# Patient Record
Sex: MALE | Race: WHITE | NOT HISPANIC OR LATINO | Employment: OTHER | URBAN - METROPOLITAN AREA
[De-identification: names, ages, dates, MRNs, and addresses within clinical notes are randomized per-mention and may not be internally consistent; named-entity substitution may affect disease eponyms.]

---

## 2018-07-11 ENCOUNTER — TRANSCRIBE ORDERS (OUTPATIENT)
Dept: PHYSICAL THERAPY | Facility: REHABILITATION | Age: 73
End: 2018-07-11

## 2018-07-11 ENCOUNTER — EVALUATION (OUTPATIENT)
Dept: PHYSICAL THERAPY | Facility: REHABILITATION | Age: 73
End: 2018-07-11
Payer: OTHER GOVERNMENT

## 2018-07-11 DIAGNOSIS — M25.561 RIGHT KNEE PAIN, UNSPECIFIED CHRONICITY: Primary | ICD-10-CM

## 2018-07-11 PROCEDURE — G8978 MOBILITY CURRENT STATUS: HCPCS | Performed by: PHYSICAL THERAPIST

## 2018-07-11 PROCEDURE — 97161 PT EVAL LOW COMPLEX 20 MIN: CPT | Performed by: PHYSICAL THERAPIST

## 2018-07-11 PROCEDURE — G8979 MOBILITY GOAL STATUS: HCPCS | Performed by: PHYSICAL THERAPIST

## 2018-07-11 RX ORDER — ASPIRIN 81 MG/1
81 TABLET ORAL DAILY
COMMUNITY

## 2018-07-11 RX ORDER — BUDESONIDE AND FORMOTEROL FUMARATE DIHYDRATE 160; 4.5 UG/1; UG/1
2 AEROSOL RESPIRATORY (INHALATION) 2 TIMES DAILY
COMMUNITY

## 2018-07-11 RX ORDER — SERTRALINE HYDROCHLORIDE 100 MG/1
50 TABLET, FILM COATED ORAL DAILY
COMMUNITY

## 2018-07-11 RX ORDER — GABAPENTIN 100 MG/1
100 CAPSULE ORAL 3 TIMES DAILY
COMMUNITY

## 2018-07-11 RX ORDER — LOSARTAN POTASSIUM 100 MG/1
100 TABLET ORAL DAILY
COMMUNITY

## 2018-07-11 RX ORDER — LORATADINE 10 MG/1
10 TABLET ORAL DAILY
COMMUNITY

## 2018-07-11 RX ORDER — TAMSULOSIN HYDROCHLORIDE 0.4 MG/1
0.4 CAPSULE ORAL
COMMUNITY

## 2018-07-11 RX ORDER — EZETIMIBE 10 MG/1
10 TABLET ORAL DAILY
COMMUNITY

## 2018-07-11 RX ORDER — DEXTROSE 40 G/100ML
INJECTION, SOLUTION INTRAVENOUS
COMMUNITY

## 2018-07-11 RX ORDER — CARBOXYMETHYLCELLULOSE SODIUM 5 MG/ML
1 SOLUTION/ DROPS OPHTHALMIC 3 TIMES DAILY PRN
COMMUNITY

## 2018-07-11 RX ORDER — INSULIN GLARGINE 100 [IU]/ML
INJECTION, SOLUTION SUBCUTANEOUS
COMMUNITY

## 2018-07-11 RX ORDER — ATORVASTATIN CALCIUM 80 MG/1
80 TABLET, FILM COATED ORAL DAILY
COMMUNITY

## 2018-07-11 NOTE — PROGRESS NOTES
PT Evaluation     Today's date: 2018  Patient name: Tonia Samuels  : [birth year]  MRN: [id number]  Referring provider: Maty Ayala MD  Dx:   Encounter Diagnosis     ICD-10-CM    1  Right knee pain, unspecified chronicity M25 561        Start Time: 945  Stop Time: 1040  Total time in clinic (min): 55 minutes    Assessment  Impairments: abnormal coordination, abnormal gait, abnormal muscle firing, abnormal or restricted ROM, abnormal movement, activity intolerance, impaired balance, impaired physical strength, lacks appropriate home exercise program, pain with function, safety issue and weight-bearing intolerance    Assessment details: Tonia Samuels is a 68 y o  male who presents with pain, decreased strength, ambulatory dysfunction and decreased balance  Due to these impairments, Patient has difficulty performing a/iadls, recreational activities and engaging in social activities  Patient's clinical presentation is consistent with their referring diagnosis of right knee pain with 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  Patient would benefit from skilled physical therapy to address their aforementioned impairments, improve their level of function and to improve their overall quality of life  Understanding of Dx/Px/POC: excellent  Goals  Short Term Goals: to be achieved in 4 weeks  1) Patient to be independent with basic HEP  2) Decrease pain at it's worst to 5/10 on VAS  3) Increase LE strength by 1/2 MMT grade in all deficient planes  4) Patient to report decreased sleep interruption secondary to pain  5) Patient to negotiate steps with a step-to pattern with use of HR  6) Increase ambulatory tolerance to 30 minutes  Long Term Goals: to be achieved by discharge  1) FOTO equal to or greater than 59   2) Patient to be independent with comprehensive HEP  3) Abolish pain for improved quality of life  4) Increase LE strength to 5/5 MMT grade in all planes to improve A/IADLS    5) Patient to negotiate steps with a reciprocal pattern without use of Hr  6) Increase ambulatory tolerance to 45 minutes  7) Patient to report no sleep interruption secondary to pain  Plan  Patient would benefit from: skilled PT  Planned modality interventions: biofeedback, cryotherapy, hydrotherapy, TENS and unattended electrical stimulation  Planned therapy interventions: activity modification, ADL retraining, balance, balance/weight bearing training, behavior modification, body mechanics training, functional ROM exercises, gait training, home exercise program, IADL retraining, joint mobilization, manual therapy, massage, neuromuscular re-education, patient education, strengthening, stretching, therapeutic activities, therapeutic exercise and transfer training  Frequency: 2-3x week  Duration in weeks: 12  Treatment plan discussed with: patient        Subjective Evaluation    History of Present Illness  Onset date: February 2018  Mechanism of injury: Patient reports that in February his right knee acutely gave out  Patient doesn't recall any specific motion or activity that caused his knee to give out  Following his injury, Patient developed increased swelling in his right knee and calf, stating that both calves felt like they weighed 50 lbs  Patient underwent an US, which was negative for DVT, but did identify the presence of a Baker's cyst  Patient reports that he has experienced increased pain ever since this episode and due to having a bad left knee, has been compensating since  Patient's right knee intermittently feels unstable and he is fearful of it buckling  Patient has been avoiding activities, including walking and the gym, to prevent further injury  Patient has intermittent clicking in his knee, but denies experiencing tingling/numbness  Patient has declined to undergo viscosupplementation and steroid injections to date   Patient anticipates requesting an MRI at his next f/u appointment with his MD   Pain  Current pain ratin  At best pain rating: 3  At worst pain rating: 10  Location: anterior right knee (pointing to patellar tendon), VMO, popliteal space  Quality: "like somebody cut you, sore"  Alleviating factors: standing on toes, Aleve  Exacerbated by: twisting, prolonged ambulation (a few blocks), squatting, getting up from chair, negotiating steps  Progression: worsening    Social Support    Employment status: not working    Diagnostic Tests  CT scan: abnormal (+) arthritic changes, per Pt  report  Treatments  Previous treatment: medication  Patient Goals  Patient goals for therapy: decreased edema, decreased pain, return to sport/leisure activities, independence with ADLs/IADLs and increased strength          Objective     Tenderness     Right Knee   Tenderness in the inferior patella, lateral patella and medial patella  Neurological Testing     Sensation     Knee   Left Knee   Intact: light touch    Right Knee   Intact: light touch     Active Range of Motion   Left Knee   Normal active range of motion    Right Knee   Normal active range of motion    Passive Range of Motion   Left Knee   Normal passive range of motion    Right Knee   Normal passive range of motion    Mobility   Patellar Mobility:   Left Knee   WFL: medial, lateral, superior and inferior  Right Knee   WFL: medial, lateral, superior and inferior    Patellar Mobility Comments   Right inferior tendon comments: (+) pain  Strength/Myotome Testing     Left Hip   Planes of Motion   Flexion: 4+  Extension: 5  Abduction: 4    Right Hip   Planes of Motion   Flexion: 4+  Extension: 5  Abduction: 4    Left Knee   Flexion: 5  Extension: 5    Right Knee   Flexion: 4 (+) anterior knee pain  Extension: 5    Left Ankle/Foot   Dorsiflexion: 5    Right Ankle/Foot   Dorsiflexion: 4+    Tests     Right Knee   Positive patella-femoral grind     Negative anterior Lachman, lateral Fernanda, medial Fernanda, valgus stress test at 0 degrees, valgus stress test at 30 degrees, varus stress test at 0 degrees and varus stress test at 30 degrees  Ambulation   Weight-Bearing Status   Weight-Bearing Status (Left): full weight bearing   Weight-Bearing Status (Right): full weight-bearing    Assistive device used: single point cane    Ambulation: Level Surfaces   Ambulation with assistive device: independent    Observational Gait   Gait: antalgic     Functional Assessment   Squat   Left within functional limits and right within functional limits  Single Leg Stance - Eyes Open   Left  Trial 1: 2 seconds    Right  Trial 1: 3 seconds    Comments  (+) right knee crepitus with bilateral squat      Flowsheet Rows      Most Recent Value   PT/OT G-Codes   Current Score  37   Projected Score  61   FOTO information reviewed  Yes   Assessment Type  Evaluation   G code set  Mobility: Walking & Moving Around   Mobility: Walking and Moving Around Current Status ()  CK   Mobility: Walking and Moving Around Goal Status ()  CJ          Precautions: HTN, CAD, DM    Daily Treatment Diary     Manual  7/11            Right gastroc, h/s, hip flexor str  Exercise Diary  7/11            Bike             VG             Slantboard gastroc str  Hamstring, hip flexor str               SLR: supine, s/l             LSD             PB wall slides                                                                                                                                                                                          Modalities  7/11

## 2018-07-11 NOTE — LETTER
2018    Olya Pastrana, [address]    Patient: Kassi Patel   YOB: 1945   Date of Visit: 2018     Encounter Diagnosis     ICD-10-CM    1  Right knee pain, unspecified chronicity M25 561        Dear Dr Jose Carlos Guerrero:    Please review the attached Plan of Care from [facility] recent visit  Please verify that you agree therapy should continue by signing the attached document and sending it back to our office  If you have any questions or concerns, please don't hesitate to call  Sincerely,    Rossy Brush, PT      Referring Provider:      I certify that I have read the below Plan of Care and certify the need for these services furnished under this plan of treatment while under my care  Olya Pastrana MD  [address]  [address]: [phone]          PT Evaluation     Today's date: 2018  Patient name: Kassi Patel  :   MRN: [id number]  Referring provider: Coby Matthews MD  Dx:   Encounter Diagnosis     ICD-10-CM    1  Right knee pain, unspecified chronicity M25 561        Start Time: 09  Stop Time: 1040  Total time in clinic (min): 55 minutes    Assessment  Impairments: abnormal coordination, abnormal gait, abnormal muscle firing, abnormal or restricted ROM, abnormal movement, activity intolerance, impaired balance, impaired physical strength, lacks appropriate home exercise program, pain with function, safety issue and weight-bearing intolerance    Assessment details: Kassi Patel is a 68 y o  male who presents with pain, decreased strength, ambulatory dysfunction and decreased balance  Due to these impairments, Patient has difficulty performing a/iadls, recreational activities and engaging in social activities  Patient's clinical presentation is consistent with their referring diagnosis of right knee pain with 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   Patient would benefit from skilled physical therapy to address their aforementioned impairments, improve their level of function and to improve their overall quality of life  Understanding of Dx/Px/POC: excellent  Goals  Short Term Goals: to be achieved in 4 weeks  1) Patient to be independent with basic HEP  2) Decrease pain at it's worst to 5/10 on VAS  3) Increase LE strength by 1/2 MMT grade in all deficient planes  4) Patient to report decreased sleep interruption secondary to pain  5) Patient to negotiate steps with a step-to pattern with use of HR  6) Increase ambulatory tolerance to 30 minutes  Long Term Goals: to be achieved by discharge  1) FOTO equal to or greater than 59   2) Patient to be independent with comprehensive HEP  3) Abolish pain for improved quality of life  4) Increase LE strength to 5/5 MMT grade in all planes to improve A/IADLS  5) Patient to negotiate steps with a reciprocal pattern without use of Hr  6) Increase ambulatory tolerance to 45 minutes  7) Patient to report no sleep interruption secondary to pain  Plan  Patient would benefit from: skilled PT  Planned modality interventions: biofeedback, cryotherapy, hydrotherapy, TENS and unattended electrical stimulation  Planned therapy interventions: activity modification, ADL retraining, balance, balance/weight bearing training, behavior modification, body mechanics training, functional ROM exercises, gait training, home exercise program, IADL retraining, joint mobilization, manual therapy, massage, neuromuscular re-education, patient education, strengthening, stretching, therapeutic activities, therapeutic exercise and transfer training  Frequency: 2-3x week  Duration in weeks: 12  Treatment plan discussed with: patient        Subjective Evaluation    History of Present Illness  Onset date: February 2018  Mechanism of injury: Patient reports that in February his right knee acutely gave out   Patient doesn't recall any specific motion or activity that caused his knee to give out  Following his injury, Patient developed increased swelling in his right knee and calf, stating that both calves felt like they weighed 50 lbs  Patient underwent an US, which was negative for DVT, but did identify the presence of a Baker's cyst  Patient reports that he has experienced increased pain ever since this episode and due to having a bad left knee, has been compensating since  Patient's right knee intermittently feels unstable and he is fearful of it buckling  Patient has been avoiding activities, including walking and the gym, to prevent further injury  Patient has intermittent clicking in his knee, but denies experiencing tingling/numbness  Patient has declined to undergo viscosupplementation and steroid injections to date  Patient anticipates requesting an MRI at his next f/u appointment with his MD   Pain  Current pain ratin  At best pain rating: 3  At worst pain rating: 10  Location: anterior right knee (pointing to patellar tendon), VMO, popliteal space  Quality: "like somebody cut you, sore"  Alleviating factors: standing on toes, Aleve  Exacerbated by: twisting, prolonged ambulation (a few blocks), squatting, getting up from chair, negotiating steps  Progression: worsening    Social Support    Employment status: not working    Diagnostic Tests  CT scan: abnormal (+) arthritic changes, per Pt  report  Treatments  Previous treatment: medication  Patient Goals  Patient goals for therapy: decreased edema, decreased pain, return to sport/leisure activities, independence with ADLs/IADLs and increased strength          Objective     Tenderness     Right Knee   Tenderness in the inferior patella, lateral patella and medial patella       Neurological Testing     Sensation     Knee   Left Knee   Intact: light touch    Right Knee   Intact: light touch     Active Range of Motion   Left Knee   Normal active range of motion    Right Knee   Normal active range of motion    Passive Range of Motion   Left Knee   Normal passive range of motion    Right Knee   Normal passive range of motion    Mobility   Patellar Mobility:   Left Knee   WFL: medial, lateral, superior and inferior  Right Knee   WFL: medial, lateral, superior and inferior    Patellar Mobility Comments   Right inferior tendon comments: (+) pain  Strength/Myotome Testing     Left Hip   Planes of Motion   Flexion: 4+  Extension: 5  Abduction: 4    Right Hip   Planes of Motion   Flexion: 4+  Extension: 5  Abduction: 4    Left Knee   Flexion: 5  Extension: 5    Right Knee   Flexion: 4 (+) anterior knee pain  Extension: 5    Left Ankle/Foot   Dorsiflexion: 5    Right Ankle/Foot   Dorsiflexion: 4+    Tests     Right Knee   Positive patella-femoral grind  Negative anterior Lachman, lateral Fernanda, medial Fernanda, valgus stress test at 0 degrees, valgus stress test at 30 degrees, varus stress test at 0 degrees and varus stress test at 30 degrees  Ambulation   Weight-Bearing Status   Weight-Bearing Status (Left): full weight bearing   Weight-Bearing Status (Right): full weight-bearing    Assistive device used: single point cane    Ambulation: Level Surfaces   Ambulation with assistive device: independent    Observational Gait   Gait: antalgic     Functional Assessment   Squat   Left within functional limits and right within functional limits       Single Leg Stance - Eyes Open   Left  Trial 1: 2 seconds    Right  Trial 1: 3 seconds    Comments  (+) right knee crepitus with bilateral squat      Flowsheet Rows      Most Recent Value   PT/OT G-Codes   Current Score  37   Projected Score  61   FOTO information reviewed  Yes   Assessment Type  Evaluation   G code set  Mobility: Walking & Moving Around   Mobility: Walking and Moving Around Current Status ()  CK   Mobility: Walking and Moving Around Goal Status ()  CJ          Precautions: HTN, CAD, DM    Daily Treatment Diary     Manual  7/11 Right gastroc, h/s, hip flexor str  Exercise Diary  7/11            Bike             VG             Slantboard gastroc str  Hamstring, hip flexor str               SLR: supine, s/l             LSD             PB wall slides                                                                                                                                                                                          Modalities  7/11

## 2018-07-18 ENCOUNTER — OFFICE VISIT (OUTPATIENT)
Dept: PHYSICAL THERAPY | Facility: REHABILITATION | Age: 73
End: 2018-07-18
Payer: OTHER GOVERNMENT

## 2018-07-18 DIAGNOSIS — M25.561 RIGHT KNEE PAIN, UNSPECIFIED CHRONICITY: Primary | ICD-10-CM

## 2018-07-18 PROCEDURE — 97112 NEUROMUSCULAR REEDUCATION: CPT

## 2018-07-18 PROCEDURE — 97140 MANUAL THERAPY 1/> REGIONS: CPT

## 2018-07-18 PROCEDURE — 97110 THERAPEUTIC EXERCISES: CPT

## 2018-07-18 NOTE — PROGRESS NOTES
Daily Note     Today's date: 2018  Patient name: Genny Woods  : [birth year]  MRN: [id number]  Referring provider: Juan Jose Rivera MD  Dx:   Encounter Diagnosis     ICD-10-CM    1  Right knee pain, unspecified chronicity M25 561                   Subjective: Pt reports pain in both knees upon arrival, R > L         Objective: See treatment diary below      Precautions: HTN, CAD, DM    Daily Treatment Diary     Manual             Right gastroc, h/s, hip flexor str  KP                                                                   Exercise Diary             Bike  8'           VG  2x15 L7           Slantboard gastroc str  5x30"           Hamstring, hip flexor str  man           SLR: supine, s/l  2x10 ea           LSD  2x10 6"           PB wall slides  2x10                                                                                                                                                                                        Modalities                                                         Assessment: Tolerated treatment well  Patient demonstrated fatigue post treatment and would benefit from continued PT  Pt did well with all exercises, required some cuing for form  Pt had minor increase in pain of L knee during wall squats, noted that R knee did not increase in pain  Pt  able to complete all other exercises with no increase in pain during or after session  Pt  1:1 with PTA for entirety  Plan: Continue per plan of care

## 2018-07-20 ENCOUNTER — OFFICE VISIT (OUTPATIENT)
Dept: PHYSICAL THERAPY | Facility: REHABILITATION | Age: 73
End: 2018-07-20
Payer: OTHER GOVERNMENT

## 2018-07-20 DIAGNOSIS — M25.561 RIGHT KNEE PAIN, UNSPECIFIED CHRONICITY: Primary | ICD-10-CM

## 2018-07-20 PROCEDURE — 97110 THERAPEUTIC EXERCISES: CPT | Performed by: PHYSICAL THERAPIST

## 2018-07-20 PROCEDURE — 97140 MANUAL THERAPY 1/> REGIONS: CPT | Performed by: PHYSICAL THERAPIST

## 2018-07-20 NOTE — PROGRESS NOTES
Daily Note     Today's date: 2018  Patient name: Jacqueline Medrano  :   MRN: [id number]  Referring provider: Balyee Hilliard MD  Dx:   Encounter Diagnosis     ICD-10-CM    1  Right knee pain, unspecified chronicity M25 561        Start Time: 940  Stop Time: 105  Total time in clinic (min): 75 minutes    Subjective: Patient reports that performing wall squats last visit irritated his left knee, otherwise he had no difficulty or pain  Patient's right knee is slightly sore today  Objective: See treatment diary below      Assessment: Tolerated treatment well  Patient demonstrated fatigue post treatment, exhibited good technique with therapeutic exercises and would benefit from continued PT  Patient continues to have left knee pain with partial squats  Patient reporting no right knee pain with prescribed exercises  Patient with increased fatigue and shakiness with supine SLR  Plan: Continue per plan of care  Progress treatment as tolerated  Precautions: HTN, CAD, DM    Daily Treatment Diary     Manual            Right gastroc, h/s, hip flexor str  KP GR          Right pat  Mobs: all planes with medial tilt   GR                                                     Exercise Diary            Bike  8' 8'          VG  2x15 L7 5' L7          Slantboard gastroc str  5x30" 5x30"          Hamstring, hip flexor str  man 3x30" ea            SLR: supine, s/l  2x10 ea 3x10 2#          LSD  2x10 6" 3x10 6"          PB wall slides  2x10 15x                                                                                                                                                                                       Modalities

## 2018-07-25 ENCOUNTER — OFFICE VISIT (OUTPATIENT)
Dept: PHYSICAL THERAPY | Facility: REHABILITATION | Age: 73
End: 2018-07-25
Payer: OTHER GOVERNMENT

## 2018-07-25 DIAGNOSIS — M25.561 RIGHT KNEE PAIN, UNSPECIFIED CHRONICITY: Primary | ICD-10-CM

## 2018-07-25 PROCEDURE — 97110 THERAPEUTIC EXERCISES: CPT | Performed by: PHYSICAL THERAPIST

## 2018-07-25 NOTE — PROGRESS NOTES
Daily Note     Today's date: 2018  Patient name: Genny Woods  :   MRN: [id number]  Referring provider: Juan Jose Rivera MD  Dx:   Encounter Diagnosis     ICD-10-CM    1  Right knee pain, unspecified chronicity M25 561        Start Time: 935  Stop Time: 104  Total time in clinic (min): 65 minutes    Subjective: Patient reports that he had mild soreness following his previous treatment session  Patient did have increased pain in his knee negotiating steps over the weekend  Objective: See treatment diary below      Assessment: Tolerated treatment well  Patient demonstrated fatigue post treatment, exhibited good technique with therapeutic exercises and would benefit from continued PT  Patient given comprehensive HEP printouts and theraband to help maximize rehabilitation potential  Patient without exacerbation of right knee pain this visit  Plan: Continue per plan of care  Progress treatment as tolerated  Precautions: HTN, CAD, DM    Daily Treatment Diary     Manual           Right gastroc, h/s, hip flexor str  KP GR GR         Right pat  Mobs: all planes with medial tilt   GR GR                                                    Exercise Diary           Bike  8' 8' 8'         VG  2x15 L7 5' L7 5' L7         Slantboard gastroc str  5x30" 5x30" 5x30"         Hamstring, hip flexor str  man 3x30" ea  3x30"         SLR: supine, s/l  2x10 ea 3x10 2# 3x10 YTB         LSD  2x10 6" 3x10 6"          PB wall slides  2x10 15x          Prone quad str  3x30"         Standing hip abd, ext in SLS with TB    20x GTB ea                                                                                                                                                              Modalities

## 2018-07-26 ENCOUNTER — APPOINTMENT (OUTPATIENT)
Dept: PHYSICAL THERAPY | Facility: REHABILITATION | Age: 73
End: 2018-07-26
Payer: OTHER GOVERNMENT

## 2018-07-27 ENCOUNTER — OFFICE VISIT (OUTPATIENT)
Dept: PHYSICAL THERAPY | Facility: REHABILITATION | Age: 73
End: 2018-07-27
Payer: OTHER GOVERNMENT

## 2018-07-27 DIAGNOSIS — M25.561 RIGHT KNEE PAIN, UNSPECIFIED CHRONICITY: Primary | ICD-10-CM

## 2018-07-27 PROCEDURE — 97110 THERAPEUTIC EXERCISES: CPT | Performed by: PHYSICAL MEDICINE & REHABILITATION

## 2018-07-27 NOTE — PROGRESS NOTES
Daily Note     Today's date: 2018  Patient name: Celeste Babcock  : [birth year]  MRN: [id number]  Referring provider: Silke Cervantes MD  Dx:   Encounter Diagnosis     ICD-10-CM    1  Right knee pain, unspecified chronicity M25 561                   Subjective: Patient offers no new complaints this session, notes continued pain; "I just live with it "      Objective: See treatment diary below      Assessment: Tolerated treatment well  No complaints of increased pain during intervention as charted  Patient demonstrated fatigue post treatment, exhibited good technique with therapeutic exercises and would benefit from continued PT  Plan: Continue per plan of care  Progress treatment as tolerated  Precautions: HTN, CAD, DM    Daily Treatment Diary     Manual          Right gastroc, h/s, hip flexor str  KP GR GR LH        Right pat  Mobs: all planes with medial tilt   GR GR [facility]                                                   Exercise Diary          Bike  8' 8' 8' 8'        VG  2x15 L7 5' L7 5' L7 5' L7        Slantboard gastroc str  5x30" 5x30" 5x30" man        Hamstring, hip flexor str  man 3x30" ea  3x30" man        SLR: supine, s/l  2x10 ea 3x10 2# 3x10 YTB 3x10 ea, YTB        LSD  2x10 6" 3x10 6"          PB wall slides  2x10 15x          Prone quad str  3x30" 3x30"        Standing hip abd, ext in SLS with TB    20x GTB ea   20x ea GTB                                                                                                                                                           Modalities               np

## 2018-07-31 ENCOUNTER — OFFICE VISIT (OUTPATIENT)
Dept: PHYSICAL THERAPY | Facility: REHABILITATION | Age: 73
End: 2018-07-31
Payer: OTHER GOVERNMENT

## 2018-07-31 DIAGNOSIS — M25.561 RIGHT KNEE PAIN, UNSPECIFIED CHRONICITY: Primary | ICD-10-CM

## 2018-07-31 PROCEDURE — 97110 THERAPEUTIC EXERCISES: CPT | Performed by: PHYSICAL THERAPIST

## 2018-07-31 NOTE — PROGRESS NOTES
Daily Note     Today's date: 2018  Patient name: Ludmila Dominguez  :   MRN: [id number]  Referring provider: Iker Castañeda MD  Dx:   Encounter Diagnosis     ICD-10-CM    1  Right knee pain, unspecified chronicity M25 561        Start Time: 940  Stop Time: 1045  Total time in clinic (min): 65 minutes    Subjective: Patient reports that his knee seems to be slowly improving  Patient states that going up and down steps continues to be the most difficult/painful  Objective: See treatment diary below      Assessment: Tolerated treatment well  Patient demonstrated fatigue post treatment, exhibited good technique with therapeutic exercises and would benefit from continued PT  Patient with improved tolerance for right knee weight-bearing exercises  Good tolerance for LSD and lunges  Plan: Continue per plan of care  Progress treatment as tolerated  Precautions: HTN, CAD, DM    Daily Treatment Diary     Manual         Right gastroc, h/s, hip flexor str  KP GR GR LH GR       Right pat  Mobs: all planes with medial tilt   GR GR [facility]                                                  Exercise Diary         Bike  8' 8' 8' 8' 8'       VG  2x15 L7 5' L7 5' L7 5' L7 3' L7 u/l       Slantboard gastroc str  5x30" 5x30" 5x30" man 5x30"       Hamstring, hip flexor str  man 3x30" ea  3x30" man        SLR: supine, s/l  2x10 ea 3x10 2# 3x10 YTB 3x10 ea, YTB        LSD  2x10 6" 3x10 6"   3x10 6"       PB wall slides  2x10 15x          Prone quad str  3x30" 3x30"        Standing hip abd, ext in SLS with TB    20x GTB ea  20x ea GTB        Sidestepping      30'x4 GTB       Slideboard lunges: backwards      2x10       Knee flexion mach               Knee ext  mach             Leg press                                                                                               Modalities               np

## 2018-08-02 ENCOUNTER — OFFICE VISIT (OUTPATIENT)
Dept: PHYSICAL THERAPY | Facility: REHABILITATION | Age: 73
End: 2018-08-02
Payer: OTHER GOVERNMENT

## 2018-08-02 DIAGNOSIS — M25.561 RIGHT KNEE PAIN, UNSPECIFIED CHRONICITY: Primary | ICD-10-CM

## 2018-08-02 PROCEDURE — 97110 THERAPEUTIC EXERCISES: CPT

## 2018-08-02 PROCEDURE — 97140 MANUAL THERAPY 1/> REGIONS: CPT

## 2018-08-02 NOTE — PROGRESS NOTES
Daily Note     Today's date: 2018  Patient name: Tawanna Meraz  :   MRN: [id number]  Referring provider: Husam Dixon MD  Dx:   Encounter Diagnosis     ICD-10-CM    1  Right knee pain, unspecified chronicity M25 561        Start Time: 930  Stop Time: 1015  Total time in clinic (min): 45 minutes    Subjective: Patient reports pain in his knee before session  Pt notes that side stepping with TB bothered his knee last session  Objective: See treatment diary below      Assessment: Tolerated treatment well  Patient demonstrated fatigue post treatment, exhibited good technique with therapeutic exercises and would benefit from continued PT  Patient unable to do SLS with GTB hip ABD/EXT without compensation from UE to unload rigtht LE  Pt needs B UE during 6" LSD  Plan: Continue per plan of care  Progress treatment as tolerated  Precautions: HTN, CAD, DM    Daily Treatment Diary     Manual   8/2      Right gastroc, h/s, hip flexor str  KP GR GR LH GR LK      Right pat  Mobs: all planes with medial tilt   GR GR LH GR LK                                                 Exercise Diary   8/2      Bike  8' 8' 8' 8' 8' 5'  time      VG  2x15 L7 5' L7 5' L7 5' L7 3' L7 u/l np  time      [employer]  5x30" 5x30" 5x30" man 5x30" 5x30"      Hamstring, hip flexor str  man 3x30" ea  3x30" man        SLR: supine, s/l  2x10 ea 3x10 2# 3x10 YTB 3x10 ea, YTB        LSD  2x10 6" 3x10 6"   3x10 6" 3x10  6"      PB wall slides  2x10 15x          Prone quad str  3x30" 3x30"        Standing hip abd, ext in SLS with TB    20x GTB ea  20x ea GTB  20x ea  GTB      Sidestepping      30'x4 GTB np pain       Slideboard lunges: backwards      2x10 2x10      Knee flexion mach               Knee ext  mach             Leg press                                                                                               Modalities   7/20 7/27             np

## 2018-08-07 ENCOUNTER — OFFICE VISIT (OUTPATIENT)
Dept: PHYSICAL THERAPY | Facility: REHABILITATION | Age: 73
End: 2018-08-07
Payer: OTHER GOVERNMENT

## 2018-08-07 DIAGNOSIS — M25.561 RIGHT KNEE PAIN, UNSPECIFIED CHRONICITY: Primary | ICD-10-CM

## 2018-08-07 PROCEDURE — 97110 THERAPEUTIC EXERCISES: CPT | Performed by: PHYSICAL THERAPIST

## 2018-08-07 PROCEDURE — 97140 MANUAL THERAPY 1/> REGIONS: CPT | Performed by: PHYSICAL THERAPIST

## 2018-08-07 NOTE — PROGRESS NOTES
Daily Note     Today's date: 2018  Patient name: Komal Ryan  :   MRN: [id number]  Referring provider: Sherin Pryor MD  Dx:   Encounter Diagnosis     ICD-10-CM    1  Right knee pain, unspecified chronicity M25 561        Start Time: 1030  Stop Time: 1130  Total time in clinic (min): 60 minutes    Subjective: Patient reports that his knee was painful earlier in the week, but after wearing his compression sock the next 2 days his knee has felt better  Patient states that negotiating steps continues to be the most painful activity  Objective: See treatment diary below      Assessment: Tolerated treatment well  Patient demonstrated fatigue post treatment, exhibited good technique with therapeutic exercises and would benefit from continued PT  Patient with improved tolerance for there ex  Patient with tenderness to palpation over anterolateral right patella  Plan: Continue per plan of care  Progress treatment as tolerated  Precautions: HTN, CAD, DM    Daily Treatment Diary     Manual       Right gastroc, h/s, hip flexor str  KP GR GR LH GR LK GR     Right pat  Mobs: all planes with medial tilt   GR GR LH GR LK GR                                                Exercise Diary       Bike  8' 8' 8' 8' 8' 5'  time 8'     VG  2x15 L7 5' L7 5' L7 5' L7 3' L7 u/l np  time 5' L6 u/l     Slantboard gastroc str  5x30" 5x30" 5x30" man 5x30" 5x30"      Hamstring, hip flexor str  man 3x30" ea  3x30" man        SLR: supine, s/l  2x10 ea 3x10 2# 3x10 YTB 3x10 ea, YTB        LSD  2x10 6" 3x10 6"   3x10 6" 3x10  6"      PB wall slides  2x10 15x          Prone quad str  3x30" 3x30"        Standing hip abd, ext in SLS with TB    20x GTB ea  20x ea GTB  20x ea  GTB      Sidestepping      30'x4 GTB np pain       Slideboard lunges: backwards      2x10 2x10 2x10     Knee flexion mach          3x10 35#     LAQ        3x10 5# Leg press        3x10 80#                                                                                       Modalities  7/11 7/20 7/27             np

## 2018-08-09 ENCOUNTER — OFFICE VISIT (OUTPATIENT)
Dept: PHYSICAL THERAPY | Facility: REHABILITATION | Age: 73
End: 2018-08-09
Payer: OTHER GOVERNMENT

## 2018-08-09 DIAGNOSIS — M25.561 RIGHT KNEE PAIN, UNSPECIFIED CHRONICITY: Primary | ICD-10-CM

## 2018-08-09 PROCEDURE — G8979 MOBILITY GOAL STATUS: HCPCS | Performed by: PHYSICAL THERAPIST

## 2018-08-09 PROCEDURE — G8978 MOBILITY CURRENT STATUS: HCPCS | Performed by: PHYSICAL THERAPIST

## 2018-08-09 PROCEDURE — 97110 THERAPEUTIC EXERCISES: CPT

## 2018-08-09 PROCEDURE — 97140 MANUAL THERAPY 1/> REGIONS: CPT

## 2018-08-09 NOTE — PROGRESS NOTES
Daily Note     Today's date: 2018  Patient name: Mello Bell  :   MRN: [id number]  Referring provider: Tereso Lindsey MD  Dx:   Encounter Diagnosis     ICD-10-CM    1  Right knee pain, unspecified chronicity M25 561                   Subjective: Patient reports that stairs are still difficult for him  Objective: See treatment diary below      Assessment: Tolerated treatment well  Patient demonstrated fatigue post treatment, exhibited good technique with therapeutic exercises and would benefit from continued PT  Patient able to tolerate small increase in weight with leg press  Pt needs VC to avoid hip external rotation during leg press and VG  Plan: Continue per plan of care  Progress treatment as tolerated  Precautions: HTN, CAD, DM    Daily Treatment Diary     Manual      Right gastroc, h/s, hip flexor str  KP GR GR LH GR LK GR     Right pat  Mobs: all planes with medial tilt   GR GR LH GR LK GR                                                Exercise Diary      Bike  8' 8' 8' 8' 8' 5'  time 8' 8'    VG  2x15 L7 5' L7 5' L7 5' L7 3' L7 u/l np  time 5' L6 u/l 5' L6    Slantboard gastroc str  5x30" 5x30" 5x30" man 5x30" 5x30"      Hamstring, hip flexor str  man 3x30" ea  3x30" man        SLR: supine, s/l  2x10 ea 3x10 2# 3x10 YTB 3x10 ea, YTB        LSD  2x10 6" 3x10 6"   3x10 6" 3x10  6"      PB wall slides  2x10 15x          Prone quad str  3x30" 3x30"        Standing hip abd, ext in SLS with TB    20x GTB ea  20x ea GTB  20x ea  GTB      Sidestepping      30'x4 GTB np pain       Slideboard lunges: backwards      2x10 2x10 2x10 2x10    Knee flexion mach          3x10 35# 3x10  35#    LAQ        3x10 5# 3x10  5#    Leg press        3x10 80# 3x10  85#                                                                                      Modalities               np

## 2018-08-14 ENCOUNTER — OFFICE VISIT (OUTPATIENT)
Dept: PHYSICAL THERAPY | Facility: REHABILITATION | Age: 73
End: 2018-08-14
Payer: OTHER GOVERNMENT

## 2018-08-14 DIAGNOSIS — M25.561 RIGHT KNEE PAIN, UNSPECIFIED CHRONICITY: Primary | ICD-10-CM

## 2018-08-14 PROCEDURE — 97110 THERAPEUTIC EXERCISES: CPT | Performed by: PHYSICAL THERAPIST

## 2018-08-14 PROCEDURE — 97140 MANUAL THERAPY 1/> REGIONS: CPT | Performed by: PHYSICAL THERAPIST

## 2018-08-14 NOTE — PROGRESS NOTES
Daily Note     Today's date: 2018  Patient name: Carmine Corcoran  :   MRN: [id number]  Referring provider: Magno Copeland MD  Dx:   Encounter Diagnosis     ICD-10-CM    1  Right knee pain, unspecified chronicity M25 561        Start Time: 09  Stop Time: 1050  Total time in clinic (min): 70 minutes    Subjective: Patient reports that steps continue to be difficult and painful  Objective: See treatment diary below      Assessment: Tolerated treatment well  Patient demonstrated fatigue post treatment, exhibited good technique with therapeutic exercises and would benefit from continued PT  Patient overall tolerating there ex better with less knee discomfort  Patient continues to have pain/difficulty with negotiating steps  Plan: Continue per plan of care  Progress treatment as tolerated  Precautions: HTN, CAD, DM    Daily Treatment Diary     Manual     Right gastroc, h/s, hip flexor str  KP GR GR LH GR LK GR  GR   Right pat  Mobs: all planes with medial tilt   GR GR LH GR LK GR  GR                                              Exercise Diary     Bike  8' 8' 8' 8' 8' 5'  time 8' 8'    VG  2x15 L7 5' L7 5' L7 5' L7 3' L7 u/l np  time 5' L6 u/l 5' L6 4' DD u/l   Slantboard gastroc str  5x30" 5x30" 5x30" man 5x30" 5x30"   5x30"   Hamstring, hip flexor str  man 3x30" ea  3x30" man        SLR: supine, s/l  2x10 ea 3x10 2# 3x10 YTB 3x10 ea, YTB        LSD  2x10 6" 3x10 6"   3x10 6" 3x10  6"      PB wall slides  2x10 15x          Prone quad str  3x30" 3x30"        Standing hip abd, ext in SLS with TB    20x GTB ea  20x ea GTB  20x ea  GTB      Sidestepping      30'x4 GTB np pain    30'x4 YTB   Slideboard lunges: backwards      2x10 2x10 2x10 2x10    Knee flexion mach          3x10 35# 3x10  35#    LAQ        3x10 5# 3x10  5#    Leg press        3x10 80# 3x10  85#    Slideboard lunges: lateral 3x10   Hip abduction clock          3x5 YTB                                                           Modalities  7/11 7/20 7/27             np

## 2018-08-16 ENCOUNTER — OFFICE VISIT (OUTPATIENT)
Dept: PHYSICAL THERAPY | Facility: REHABILITATION | Age: 73
End: 2018-08-16
Payer: OTHER GOVERNMENT

## 2018-08-16 DIAGNOSIS — M25.561 RIGHT KNEE PAIN, UNSPECIFIED CHRONICITY: Primary | ICD-10-CM

## 2018-08-16 PROCEDURE — 97140 MANUAL THERAPY 1/> REGIONS: CPT | Performed by: PHYSICAL THERAPIST

## 2018-08-16 PROCEDURE — 97110 THERAPEUTIC EXERCISES: CPT | Performed by: PHYSICAL THERAPIST

## 2018-08-16 NOTE — PROGRESS NOTES
Daily Note     Today's date: 2018  Patient name: Bc Franco  : [birth year]  MRN: [id number]  Referring provider: Tiesha Amador MD  Dx:   Encounter Diagnosis     ICD-10-CM    1  Right knee pain, unspecified chronicity M25 561        Start Time: 930  Stop Time: 1040  Total time in clinic (min): 70 minutes    Subjective: Patient reports that he must have slept the wrong way and twisted his knee because it's more painful today  Patient states that overall it has been improving, however  Objective: See treatment diary below      Assessment: Tolerated treatment well  Patient demonstrated fatigue post treatment, exhibited good technique with therapeutic exercises and would benefit from continued PT  Patient with increased tenderness to palpation over right patellar tendon  Good tolerance for IASTM to patellar tendon  Patient with painful eccentric knee flexion  Plan: Continue per plan of care  Progress treatment as tolerated  Precautions: HTN, CAD, DM    Daily Treatment Diary     Manual              Right gastroc, h/s, hip flexor str  GR            Right pat  Mobs: all planes with medial tilt GR            IASTM to right patellar tendon GR                                          Exercise Diary              Bike 8'            VG             Slantboard gastroc str  Hamstring, hip flexor str  SLR: supine, s/l             LSD 2x10 8"            PB wall slides             Prone quad str  Standing hip abd, ext in SLS with TB             Sidestepping 30'x6 YTB            Slideboard lunges: backwards             Knee flexion mach               LAQ             Leg press             Slideboard lunges: lateral 3x10            Hip abduction clock 3x5 YTB                                                                    Modalities               np

## 2018-08-21 ENCOUNTER — APPOINTMENT (OUTPATIENT)
Dept: PHYSICAL THERAPY | Facility: REHABILITATION | Age: 73
End: 2018-08-21
Payer: OTHER GOVERNMENT

## 2018-08-23 ENCOUNTER — OFFICE VISIT (OUTPATIENT)
Dept: PHYSICAL THERAPY | Facility: REHABILITATION | Age: 73
End: 2018-08-23
Payer: OTHER GOVERNMENT

## 2018-08-23 DIAGNOSIS — M25.561 RIGHT KNEE PAIN, UNSPECIFIED CHRONICITY: Primary | ICD-10-CM

## 2018-08-23 PROCEDURE — 97110 THERAPEUTIC EXERCISES: CPT

## 2018-08-23 PROCEDURE — 97140 MANUAL THERAPY 1/> REGIONS: CPT

## 2018-08-23 NOTE — PROGRESS NOTES
Daily Note     Today's date: 2018  Patient name: Padmini Ennis  : [birth year]  MRN: [id number]  Referring provider: Phani Roque MD  Dx:   Encounter Diagnosis     ICD-10-CM    1  Right knee pain, unspecified chronicity M25 561        Start Time: 930  Stop Time:   Total time in clinic (min): 45 minutes    Subjective: Patient reports that he went to the gym yesterday and his right knee is fatigued      Objective: See treatment diary below      Assessment: Tolerated treatment well  Patient demonstrated fatigue post treatment, exhibited good technique with therapeutic exercises and would benefit from continued PT  Patient was able to tolerate TE this session without increase in pain  Pt was unable to complete 8" LSD and need a 4" step to complete movement with good technique  Plan: Continue per plan of care  Progress treatment as tolerated  Precautions: HTN, CAD, DM    Daily Treatment Diary     Manual             Right gastroc, h/s, hip flexor str  GR LK           Right pat  Mobs: all planes with medial tilt GR LK  PROM           IASTM to right patellar tendon GR LK                                         Exercise Diary             Bike 8' 8'           VG             Slantboard gastroc str  Hamstring, hip flexor str  SLR: supine, s/l             LSD 2x10 8" 3x10  4"  fatigue           PB wall slides             Prone quad str  Standing hip abd, ext in SLS with TB             Sidestepping 30'x6 YTB counter 2 laps  GTB           Slideboard lunges: backwards             Knee flexion mach               LAQ             Leg press             Slideboard lunges: lateral 3x10 3x10           Hip abduction clock 3x5 YTB No band  5x                                                                   Modalities               np

## 2018-08-28 ENCOUNTER — OFFICE VISIT (OUTPATIENT)
Dept: PHYSICAL THERAPY | Facility: REHABILITATION | Age: 73
End: 2018-08-28
Payer: OTHER GOVERNMENT

## 2018-08-28 DIAGNOSIS — M25.561 RIGHT KNEE PAIN, UNSPECIFIED CHRONICITY: Primary | ICD-10-CM

## 2018-08-28 PROCEDURE — 97140 MANUAL THERAPY 1/> REGIONS: CPT | Performed by: PHYSICAL THERAPIST

## 2018-08-28 PROCEDURE — 97110 THERAPEUTIC EXERCISES: CPT | Performed by: PHYSICAL THERAPIST

## 2018-08-30 ENCOUNTER — TRANSCRIBE ORDERS (OUTPATIENT)
Dept: PHYSICAL THERAPY | Facility: REHABILITATION | Age: 73
End: 2018-08-30

## 2018-08-30 ENCOUNTER — OFFICE VISIT (OUTPATIENT)
Dept: PHYSICAL THERAPY | Facility: REHABILITATION | Age: 73
End: 2018-08-30
Payer: OTHER GOVERNMENT

## 2018-08-30 DIAGNOSIS — M25.561 RIGHT KNEE PAIN, UNSPECIFIED CHRONICITY: Primary | ICD-10-CM

## 2018-08-30 DIAGNOSIS — G89.29 CHRONIC PAIN OF RIGHT KNEE: Primary | ICD-10-CM

## 2018-08-30 DIAGNOSIS — M25.561 CHRONIC PAIN OF RIGHT KNEE: Primary | ICD-10-CM

## 2018-08-30 PROCEDURE — G8978 MOBILITY CURRENT STATUS: HCPCS | Performed by: PHYSICAL THERAPIST

## 2018-08-30 PROCEDURE — G8980 MOBILITY D/C STATUS: HCPCS | Performed by: PHYSICAL THERAPIST

## 2018-08-30 PROCEDURE — G8979 MOBILITY GOAL STATUS: HCPCS | Performed by: PHYSICAL THERAPIST

## 2018-08-30 PROCEDURE — 97140 MANUAL THERAPY 1/> REGIONS: CPT | Performed by: PHYSICAL THERAPIST

## 2018-08-30 NOTE — LETTER
2018    Enriqueta Triana MD  [address]  [address]    Patient: González Parker   YOB: 1945   Date of Visit: 2018     Encounter Diagnosis     ICD-10-CM    1  Right knee pain, unspecified chronicity M25 561        Dear Dr Francis August:    Please review the attached Plan of Care from [facility] recent visit  Please verify that you agree therapy should continue by signing the attached document and sending it back to our office  If you have any questions or concerns, please don't hesitate to call  Sincerely,    Saeid Dick, PT      Referring Provider:      I certify that I have read the below Plan of Care and certify the need for these services furnished under this plan of treatment while under my care  Enriqueta Triana MD  [address]  [address]  [place] [address]: [phone]          PT Re-Evaluation     Today's date: 2018  Patient name: González Parker  :   MRN: [id number]  Referring provider: Bear Zhao MD  Dx:   Encounter Diagnosis     ICD-10-CM    1  Right knee pain, unspecified chronicity M25 561        Start Time: 1400  Stop Time: 1445  Total time in clinic (min): 45 minutes    Assessment  Impairments: abnormal coordination, abnormal gait, abnormal muscle firing, abnormal or restricted ROM, abnormal movement, activity intolerance, impaired balance, impaired physical strength, lacks appropriate home exercise program, pain with function, safety issue and weight-bearing intolerance    Assessment details: Since beginning physical therapy, Manohar Partida has attended a total number of 14 visits and has maintained excellent compliance with established POC  Patient has made significant improvements in all areas, including decreased pain, increased strength, increased ROM, improved flexibility, improved joint mobility and improved overall level of function   Patient is reporting improved ability to perform a/iadls and engaging in social activities  Despite these improvements, Patient continues to present with pain and decreased strength  Patient would continue to benefit from skilled physical therapy to address his aforementioned impairments, improve their level of function and to improve their overall quality of life  Understanding of Dx/Px/POC: excellent  Goals  Short Term Goals: to be achieved in 4 weeks ALL GOALS MET  1) Patient to be independent with basic HEP  2) Decrease pain at it's worst to 5/10 on VAS  3) Increase LE strength by 1/2 MMT grade in all deficient planes  4) Patient to report decreased sleep interruption secondary to pain  5) Patient to negotiate steps with a step-to pattern with use of HR  6) Increase ambulatory tolerance to 30 minutes  Long Term Goals: to be achieved by discharge ALL GOALS PROGRESSING  1) FOTO equal to or greater than 59   2) Patient to be independent with comprehensive HEP  3) Abolish pain for improved quality of life  4) Increase LE strength to 5/5 MMT grade in all planes to improve A/IADLS  5) Patient to negotiate steps with a reciprocal pattern without use of Hr  6) Increase ambulatory tolerance to 45 minutes  7) Patient to report no sleep interruption secondary to pain  Plan  Patient would benefit from: skilled PT  Planned modality interventions: biofeedback, cryotherapy, hydrotherapy, TENS and unattended electrical stimulation  Planned therapy interventions: activity modification, ADL retraining, balance, balance/weight bearing training, behavior modification, body mechanics training, functional ROM exercises, gait training, home exercise program, IADL retraining, joint mobilization, manual therapy, massage, neuromuscular re-education, patient education, strengthening, stretching, therapeutic activities, therapeutic exercise and transfer training  Frequency: 2-3x week    Duration in weeks: 6  Treatment plan discussed with: patient        Subjective Evaluation    History of Present Illness  Mechanism of injury: Patient reports that "the whole thing has improved " Patient is reporting decreased frequency and intensity of right knee pain  Patient states that steps main the most difficult activity, but has reduced in pain  Patient continues to have pain with kneeling and getting off the floor  Patient estimates his right knee overall level of function to be approximately 75%  Pain  Current pain rating: 3  At best pain rating: 3  At worst pain ratin  Location: right anterior > posterior knee pain  Quality: sharp, stiffness  Patient Goals  Patient goals for therapy: decreased pain, improved balance, increased motion, return to sport/leisure activities, independence with ADLs/IADLs and increased strength          Objective     Tenderness     Right Knee   Tenderness in the inferior patella, lateral patella and medial patella  Neurological Testing     Sensation     Knee   Left Knee   Intact: light touch    Right Knee   Intact: light touch     Active Range of Motion   Left Knee   Normal active range of motion    Right Knee   Normal active range of motion    Passive Range of Motion   Left Knee   Normal passive range of motion    Right Knee   Normal passive range of motion    Mobility   Patellar Mobility:   Left Knee   WFL: medial, lateral, superior and inferior  Right Knee   WFL: medial, lateral, superior and inferior    Patellar Mobility Comments   Right inferior tendon comments: (+) pain  Strength/Myotome Testing     Left Hip   Planes of Motion   Flexion: 5  Extension: 5  Abduction: 4+    Right Hip   Planes of Motion   Flexion: 5 and WFL  Extension: 5  Abduction: 4+    Left Knee   Flexion: 5  Extension: 5    Right Knee   Flexion: 5 (+) anterior knee pain  Extension: 5    Left Ankle/Foot   Dorsiflexion: 5    Right Ankle/Foot   Dorsiflexion: 4+    Tests     Right Knee   Positive patella-femoral grind       Ambulation   Weight-Bearing Status Weight-Bearing Status (Left): full weight bearing   Weight-Bearing Status (Right): full weight-bearing      Ambulation: Level Surfaces   Ambulation with assistive device: independent    Functional Assessment   Squat   Left within functional limits and right within functional limits  Single Leg Stance - Eyes Open   Left  Trial 1: 9 seconds    Right  Trial 1: 15 seconds    Comments  Bilateral squat: fair technique, increased anterior translation of knees past toes, wide FORREST  Front Step Down: good eccentric control with increased reliance on hand rail and contralateral pelvic drop      Flowsheet Rows      Most Recent Value   PT/OT G-Codes   Current Score  41   Projected Score  59   FOTO information reviewed  Yes   Assessment Type  Re-evaluation   G code set  Mobility: Walking & Moving Around   Mobility: Walking and Moving Around Current Status ()  CJ   Mobility: Walking and Moving Around Goal Status ()  CI          Precautions: HTN, CAD, DM    Daily Treatment Diary     Manual  8/16 8/23 8/28 8/30         Right gastroc, h/s, hip flexor str  GR LK CF          Right pat  Mobs: all planes with medial tilt GR LK  PROM CF PROM           IASTM to right patellar tendon GR LK CF           Reevaluation    GR                          Exercise Diary  8/16 8/23 8/28 8/30         Bike 8' 8' 8'   8'         VG             Slantboard gastroc str  30" x 3           Hamstring, hip flexor str  30" x 3 ea          SLR: supine, s/l   2 x10 ea          LSD 2x10 8" 3x10  4"  fatigue  6"   3 x10           PB wall slides             Prone quad str  Standing hip abd, ext in SLS with TB   YTB   2 x10 ea           Sidestepping 30'x6 YTB counter 2 laps  GTB YTB   5 laps @ bar           Slideboard lunges: backwards             Knee flexion mach               LAQ             Leg press             Slideboard lunges: lateral 3x10 3x10           Hip abduction clock 3x5 YTB No band  5x Modalities  7/11 7/20 7/27             np

## 2018-08-30 NOTE — PROGRESS NOTES
PT Re-Evaluation     Today's date: 2018  Patient name: Padmini Ennis  : [birth year]  MRN: [id number]  Referring provider: Phani Roque MD  Dx:   Encounter Diagnosis     ICD-10-CM    1  Right knee pain, unspecified chronicity M25 561        Start Time: 1400  Stop Time: 1445  Total time in clinic (min): 45 minutes    Assessment  Impairments: abnormal coordination, abnormal gait, abnormal muscle firing, abnormal or restricted ROM, abnormal movement, activity intolerance, impaired balance, impaired physical strength, lacks appropriate home exercise program, pain with function, safety issue and weight-bearing intolerance    Assessment details: Since beginning physical therapy, Jaclyn Cornelius has attended a total number of 14 visits and has maintained excellent compliance with established POC  Patient has made significant improvements in all areas, including decreased pain, increased strength, increased ROM, improved flexibility, improved joint mobility and improved overall level of function  Patient is reporting improved ability to perform a/iadls and engaging in social activities  Despite these improvements, Patient continues to present with pain and decreased strength  Patient would continue to benefit from skilled physical therapy to address his aforementioned impairments, improve their level of function and to improve their overall quality of life  Understanding of Dx/Px/POC: excellent  Goals  Short Term Goals: to be achieved in 4 weeks ALL GOALS MET  1) Patient to be independent with basic HEP  2) Decrease pain at it's worst to 5/10 on VAS  3) Increase LE strength by 1/2 MMT grade in all deficient planes  4) Patient to report decreased sleep interruption secondary to pain  5) Patient to negotiate steps with a step-to pattern with use of HR  6) Increase ambulatory tolerance to 30 minutes      Long Term Goals: to be achieved by discharge ALL GOALS PROGRESSING  1) FOTO equal to or greater than 59   2) Patient to be independent with comprehensive HEP  3) Abolish pain for improved quality of life  4) Increase LE strength to 5/5 MMT grade in all planes to improve A/IADLS  5) Patient to negotiate steps with a reciprocal pattern without use of Hr  6) Increase ambulatory tolerance to 45 minutes  7) Patient to report no sleep interruption secondary to pain  Plan  Patient would benefit from: skilled PT  Planned modality interventions: biofeedback, cryotherapy, hydrotherapy, TENS and unattended electrical stimulation  Planned therapy interventions: activity modification, ADL retraining, balance, balance/weight bearing training, behavior modification, body mechanics training, functional ROM exercises, gait training, home exercise program, IADL retraining, joint mobilization, manual therapy, massage, neuromuscular re-education, patient education, strengthening, stretching, therapeutic activities, therapeutic exercise and transfer training  Frequency: 2-3x week  Duration in weeks: 6  Treatment plan discussed with: patient        Subjective Evaluation    History of Present Illness  Mechanism of injury: Patient reports that "the whole thing has improved " Patient is reporting decreased frequency and intensity of right knee pain  Patient states that steps main the most difficult activity, but has reduced in pain  Patient continues to have pain with kneeling and getting off the floor  Patient estimates his right knee overall level of function to be approximately 75%  Pain  Current pain rating: 3  At best pain rating: 3  At worst pain ratin  Location: right anterior > posterior knee pain  Quality: sharp, stiffness      Patient Goals  Patient goals for therapy: decreased pain, improved balance, increased motion, return to sport/leisure activities, independence with ADLs/IADLs and increased strength          Objective     Tenderness     Right Knee   Tenderness in the inferior patella, lateral patella and medial patella  Neurological Testing     Sensation     Knee   Left Knee   Intact: light touch    Right Knee   Intact: light touch     Active Range of Motion   Left Knee   Normal active range of motion    Right Knee   Normal active range of motion    Passive Range of Motion   Left Knee   Normal passive range of motion    Right Knee   Normal passive range of motion    Mobility   Patellar Mobility:   Left Knee   WFL: medial, lateral, superior and inferior  Right Knee   WFL: medial, lateral, superior and inferior    Patellar Mobility Comments   Right inferior tendon comments: (+) pain  Strength/Myotome Testing     Left Hip   Planes of Motion   Flexion: 5  Extension: 5  Abduction: 4+    Right Hip   Planes of Motion   Flexion: 5 and WFL  Extension: 5  Abduction: 4+    Left Knee   Flexion: 5  Extension: 5    Right Knee   Flexion: 5 (+) anterior knee pain  Extension: 5    Left Ankle/Foot   Dorsiflexion: 5    Right Ankle/Foot   Dorsiflexion: 4+    Tests     Right Knee   Positive patella-femoral grind  Ambulation   Weight-Bearing Status   Weight-Bearing Status (Left): full weight bearing   Weight-Bearing Status (Right): full weight-bearing      Ambulation: Level Surfaces   Ambulation with assistive device: independent    Functional Assessment   Squat   Left within functional limits and right within functional limits       Single Leg Stance - Eyes Open   Left  Trial 1: 9 seconds    Right  Trial 1: 15 seconds    Comments  Bilateral squat: fair technique, increased anterior translation of knees past toes, wide FORREST  Front Step Down: good eccentric control with increased reliance on hand rail and contralateral pelvic drop      Flowsheet Rows      Most Recent Value   PT/OT G-Codes   Current Score  41   Projected Score  59   FOTO information reviewed  Yes   Assessment Type  Re-evaluation   G code set  Mobility: Walking & Moving Around   Mobility: Walking and Moving Around Current Status ()  CJ   Mobility: Walking and Moving Around Goal Status ()  CI          Precautions: HTN, CAD, DM    Daily Treatment Diary     Manual  8/16 8/23 8/28 8/30         Right gastroc, h/s, hip flexor str  GR LK CF          Right pat  Mobs: all planes with medial tilt GR LK  PROM CF PROM           IASTM to right patellar tendon GR LK CF           Reevaluation    GR                          Exercise Diary  8/16 8/23 8/28 8/30         Bike 8' 8' 8'   8'         VG             Slantboard gastroc str  30" x 3           Hamstring, hip flexor str  30" x 3 ea          SLR: supine, s/l   2 x10 ea          LSD 2x10 8" 3x10  4"  fatigue  6"   3 x10           PB wall slides             Prone quad str  Standing hip abd, ext in SLS with TB   YTB   2 x10 ea           Sidestepping 30'x6 YTB counter 2 laps  GTB YTB   5 laps @ bar           Slideboard lunges: backwards             Knee flexion mach               LAQ             Leg press             Slideboard lunges: lateral 3x10 3x10           Hip abduction clock 3x5 YTB No band  5x                                                                   Modalities  7/11 7/20 7/27             np

## 2018-09-25 NOTE — PROGRESS NOTES
Jaclyn Cornelius has attended a total of 14 physical therapy appointments to date  Patient was last treated on 8/30/18 and has no remaining appointments scheduled  Goals, objective and subjective information unable to be updated at this time  Patient has exhausted his authorized number of visits and has failed to obtain additional visits from the [facility]  Patient will be discharged at this time

## 2018-10-15 ENCOUNTER — EVALUATION (OUTPATIENT)
Dept: PHYSICAL THERAPY | Facility: REHABILITATION | Age: 73
End: 2018-10-15
Payer: OTHER GOVERNMENT

## 2018-10-15 DIAGNOSIS — G89.29 CHRONIC PAIN OF RIGHT KNEE: Primary | ICD-10-CM

## 2018-10-15 DIAGNOSIS — M25.561 CHRONIC PAIN OF RIGHT KNEE: Primary | ICD-10-CM

## 2018-10-15 PROCEDURE — G8979 MOBILITY GOAL STATUS: HCPCS | Performed by: PHYSICAL THERAPIST

## 2018-10-15 PROCEDURE — G8978 MOBILITY CURRENT STATUS: HCPCS | Performed by: PHYSICAL THERAPIST

## 2018-10-15 PROCEDURE — 97161 PT EVAL LOW COMPLEX 20 MIN: CPT | Performed by: PHYSICAL THERAPIST

## 2018-10-15 NOTE — LETTER
October 15, 2018    Carlita Gomez, [address] [place] [address]    Patient: Melquiades Lambert   YOB: 1945   Date of Visit: 10/15/2018     Encounter Diagnosis     ICD-10-CM    1  Chronic pain of right knee M25 561     G89 29        Dear Dr Patrick Dickens:    Please review the attached Plan of Care from [facility] recent visit  Please verify that you agree therapy should continue by signing the attached document and sending it back to our office  If you have any questions or concerns, please don't hesitate to call  Sincerely,    Norma Coburn PT      Referring Provider:      I certify that I have read the below Plan of Care and certify the need for these services furnished under this plan of treatment while under my care  Carlita Gomez MD  [address]  [address]: [phone]          PT Evaluation     Today's date: 10/15/2018  Patient name: Melquiades Lambert  :   MRN: [id number]  Referring provider: Altagracia Zhao MD  Dx:   Encounter Diagnosis     ICD-10-CM    1  Chronic pain of right knee M25 561     G89 29        Start Time: 1535  Stop Time: 1620  Total time in clinic (min): 45 minutes    Assessment  Impairments: abnormal coordination, abnormal gait, abnormal muscle firing, abnormal or restricted ROM, abnormal movement, activity intolerance, impaired balance, impaired physical strength, lacks appropriate home exercise program, pain with function, safety issue and weight-bearing intolerance    Assessment details: Melquiades Lambert is a 68 y o  male who presents with pain, decreased strength, decreased joint mobility and ambulatory dysfunction  Due to these impairments, Patient has difficulty performing a/iadls, recreational activities and engaging in social activities  Patient's clinical presentation is consistent with their referring diagnosis of right knee pain with 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   Patient had previously participated in 14 physical therapy visits and was greatly benefiting from skilled services  Patient would continue benefit from skilled physical therapy to address their aforementioned impairments, improve their level of function and to improve their overall quality of life  Understanding of Dx/Px/POC: excellent  Goals  Short Term Goals: to be achieved in 4 weeks  1) Patient to be independent with basic HEP  2) Decrease pain at it's worst to 5/10 on VAS  3) Increase LE strength by 1/2 MMT grade in all deficient planes  4) Patient to report decreased sleep interruption secondary to pain  5) Patient to negotiate steps with a step-to pattern with use of HR  6) Increase ambulatory tolerance to 30 minutes  Long Term Goals: to be achieved by discharge  1) FOTO equal to or greater than 57   2) Patient to be independent with comprehensive HEP  3) Abolish pain for improved quality of life  4) Increase LE strength to 5/5 MMT grade in all planes to improve A/IADLS  5) Patient to negotiate steps with a reciprocal pattern without use of Hr  6) Increase ambulatory tolerance to 45 minutes  7) Patient to report no sleep interruption secondary to pain  Plan  Patient would benefit from: skilled PT  Planned modality interventions: biofeedback, cryotherapy, hydrotherapy, TENS and unattended electrical stimulation  Planned therapy interventions: activity modification, ADL retraining, balance, balance/weight bearing training, behavior modification, body mechanics training, functional ROM exercises, gait training, home exercise program, IADL retraining, joint mobilization, manual therapy, massage, neuromuscular re-education, patient education, strengthening, stretching, therapeutic activities, therapeutic exercise and transfer training  Frequency: 2-3x week    Duration in weeks: 8  Treatment plan discussed with: patient        Subjective Evaluation    History of Present Illness  Mechanism of injury: Patient is reporting decreased intensity and frequency of right knee pain since beginning physical therapy  Patient states that negotiating steps remain the most difficult activity  Patient continues to have pain with kneeling and getting off the floor  Patient has intermittent cracking in his knee superficially  Patient denies experiencing tingling/numbness  Patient has returned to the gym with minimal limitation, but does have pain with the knee extension machine  Patient estimates his right knee overall level of function to be approximately 90%  Patient has a f/u appointment with his physician on 10/22  Pain  Current pain ratin  At best pain ratin  At worst pain ratin  Location: right knee VMO  Quality: soreness  Alleviating factors: wearing compression sock  Exacerbated by: negotiating steps (descending > ascending), kneeling  Treatments  Previous treatment: injection treatment, physical therapy and medication  Patient Goals  Patient goals for therapy: decreased pain, return to sport/leisure activities, independence with ADLs/IADLs and increased strength          Objective     Tenderness     Right Knee   Tenderness in the medial patella and superior patella  Neurological Testing     Sensation     Knee   Left Knee   Intact: light touch    Right Knee   Intact: light touch     Active Range of Motion   Left Knee   Normal active range of motion    Right Knee   Normal active range of motion    Passive Range of Motion   Left Knee   Normal passive range of motion    Right Knee   Normal passive range of motion    Mobility   Patellar Mobility:   Left Knee   WFL: medial, lateral, superior and inferior  Right Knee   WFL: medial, lateral, superior and inferior    Patellar Mobility Comments   Right inferior tendon comments: (+) pain       Strength/Myotome Testing     Left Hip   Planes of Motion   Flexion: 5  Extension: 5  Abduction: 4+    Right Hip   Planes of Motion   Flexion: 5 and WFL  Extension: 5  Abduction: 4    Left Knee   Flexion: 5  Extension: 5    Right Knee   Flexion: 5 (+) anterior knee pain  Extension: 5    Left Ankle/Foot   Dorsiflexion: 5    Right Ankle/Foot   Dorsiflexion: 4+    Tests     Left Knee   Positive medial Fernanda and patella-femoral grind  Negative lateral Fernanda, valgus stress test at 0 degrees, valgus stress test at 30 degrees, varus stress test at 0 degrees and varus stress test at 30 degrees  Right Knee   Positive patella-femoral grind  Negative lateral Fernanda, medial Fernanda, valgus stress test at 0 degrees, valgus stress test at 30 degrees, varus stress test at 0 degrees and varus stress test at 30 degrees  Ambulation   Weight-Bearing Status   Weight-Bearing Status (Left): full weight bearing   Weight-Bearing Status (Right): full weight-bearing      Ambulation: Level Surfaces   Ambulation with assistive device: independent    Functional Assessment   Squat   Left within functional limits and right within functional limits  Single Leg Stance - Eyes Open   Left  Trial 1: 2 seconds    Right  Trial 1: 16 seconds    Comments  Bilateral squat: good technique, (+) right knee pain  Front Step Down off 6" step: R: fair eccentric control, heavy reliance on HR, (+) pain ; L: poor eccentric control, heavy reliance on HR, (+) pain      Flowsheet Rows      Most Recent Value   PT/OT G-Codes   Current Score  45   Projected Score  62   FOTO information reviewed  Yes   Assessment Type  Evaluation   G code set  Mobility: Walking & Moving Around   Mobility: Walking and Moving Around Current Status ()  CJ   Mobility: Walking and Moving Around Goal Status ()  CI          Precautions: HTN, CAD, DM    Daily Treatment Diary     Manual  10/15            Right gastroc, h/s, hip flexor str  Right pat   Mobs: all planes with medial tilt             IASTM to right patellar tendon             Reevaluation                              Exercise Diary  10/15 Bike             VG             Slantboard gastroc str  Hamstring, hip flexor str  SLR: supine, s/l             LSD             PB wall slides             Prone quad str               Standing hip abd, ext in SLS with TB             Sidestepping             Slideboard lunges: backwards             LAQ             Hip abduction clock             Leg press             Slideboard lunges: lateral                                                                                  Modalities

## 2018-10-15 NOTE — PROGRESS NOTES
PT Evaluation     Today's date: 10/15/2018  Patient name: Luis Mike  :   MRN: [id number]  Referring provider: Alessandra Pelaez MD  Dx:   Encounter Diagnosis     ICD-10-CM    1  Chronic pain of right knee M25 561     G89 29        Start Time:   Stop Time: 1620  Total time in clinic (min): 45 minutes    Assessment  Impairments: abnormal coordination, abnormal gait, abnormal muscle firing, abnormal or restricted ROM, abnormal movement, activity intolerance, impaired balance, impaired physical strength, lacks appropriate home exercise program, pain with function, safety issue and weight-bearing intolerance    Assessment details: Luis Mike is a 68 y o  male who presents with pain, decreased strength, decreased joint mobility and ambulatory dysfunction  Due to these impairments, Patient has difficulty performing a/iadls, recreational activities and engaging in social activities  Patient's clinical presentation is consistent with their referring diagnosis of right knee pain with 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  Patient had previously participated in 14 physical therapy visits and was greatly benefiting from skilled services  Patient would continue benefit from skilled physical therapy to address their aforementioned impairments, improve their level of function and to improve their overall quality of life  Understanding of Dx/Px/POC: excellent  Goals  Short Term Goals: to be achieved in 4 weeks  1) Patient to be independent with basic HEP  2) Decrease pain at it's worst to 5/10 on VAS  3) Increase LE strength by 1/2 MMT grade in all deficient planes  4) Patient to report decreased sleep interruption secondary to pain  5) Patient to negotiate steps with a step-to pattern with use of HR  6) Increase ambulatory tolerance to 30 minutes  Long Term Goals: to be achieved by discharge  1) FOTO equal to or greater than 57   2) Patient to be independent with comprehensive HEP    3) Abolish pain for improved quality of life  4) Increase LE strength to 5/5 MMT grade in all planes to improve A/IADLS  5) Patient to negotiate steps with a reciprocal pattern without use of Hr  6) Increase ambulatory tolerance to 45 minutes  7) Patient to report no sleep interruption secondary to pain  Plan  Patient would benefit from: skilled PT  Planned modality interventions: biofeedback, cryotherapy, hydrotherapy, TENS and unattended electrical stimulation  Planned therapy interventions: activity modification, ADL retraining, balance, balance/weight bearing training, behavior modification, body mechanics training, functional ROM exercises, gait training, home exercise program, IADL retraining, joint mobilization, manual therapy, massage, neuromuscular re-education, patient education, strengthening, stretching, therapeutic activities, therapeutic exercise and transfer training  Frequency: 2-3x week  Duration in weeks: 8  Treatment plan discussed with: patient        Subjective Evaluation    History of Present Illness  Mechanism of injury: Patient is reporting decreased intensity and frequency of right knee pain since beginning physical therapy  Patient states that negotiating steps remain the most difficult activity  Patient continues to have pain with kneeling and getting off the floor  Patient has intermittent cracking in his knee superficially  Patient denies experiencing tingling/numbness  Patient has returned to the gym with minimal limitation, but does have pain with the knee extension machine  Patient estimates his right knee overall level of function to be approximately 90%  Patient has a f/u appointment with his physician on 10/22  Pain  Current pain ratin  At best pain ratin  At worst pain ratin  Location: right knee VMO  Quality: soreness  Alleviating factors: wearing compression sock  Exacerbated by: negotiating steps (descending > ascending), kneeling      Treatments  Previous treatment: injection treatment, physical therapy and medication  Patient Goals  Patient goals for therapy: decreased pain, return to sport/leisure activities, independence with ADLs/IADLs and increased strength          Objective     Tenderness     Right Knee   Tenderness in the medial patella and superior patella  Neurological Testing     Sensation     Knee   Left Knee   Intact: light touch    Right Knee   Intact: light touch     Active Range of Motion   Left Knee   Normal active range of motion    Right Knee   Normal active range of motion    Passive Range of Motion   Left Knee   Normal passive range of motion    Right Knee   Normal passive range of motion    Mobility   Patellar Mobility:   Left Knee   WFL: medial, lateral, superior and inferior  Right Knee   WFL: medial, lateral, superior and inferior    Patellar Mobility Comments   Right inferior tendon comments: (+) pain  Strength/Myotome Testing     Left Hip   Planes of Motion   Flexion: 5  Extension: 5  Abduction: 4+    Right Hip   Planes of Motion   Flexion: 5 and WFL  Extension: 5  Abduction: 4    Left Knee   Flexion: 5  Extension: 5    Right Knee   Flexion: 5 (+) anterior knee pain  Extension: 5    Left Ankle/Foot   Dorsiflexion: 5    Right Ankle/Foot   Dorsiflexion: 4+    Tests     Left Knee   Positive medial Fernanda and patella-femoral grind  Negative lateral Fernanda, valgus stress test at 0 degrees, valgus stress test at 30 degrees, varus stress test at 0 degrees and varus stress test at 30 degrees  Right Knee   Positive patella-femoral grind  Negative lateral Fernanda, medial Fernanda, valgus stress test at 0 degrees, valgus stress test at 30 degrees, varus stress test at 0 degrees and varus stress test at 30 degrees       Ambulation   Weight-Bearing Status   Weight-Bearing Status (Left): full weight bearing   Weight-Bearing Status (Right): full weight-bearing      Ambulation: Level Surfaces   Ambulation with assistive device: independent    Functional Assessment   Squat   Left within functional limits and right within functional limits  Single Leg Stance - Eyes Open   Left  Trial 1: 2 seconds    Right  Trial 1: 16 seconds    Comments  Bilateral squat: good technique, (+) right knee pain  Front Step Down off 6" step: R: fair eccentric control, heavy reliance on HR, (+) pain ; L: poor eccentric control, heavy reliance on HR, (+) pain      Flowsheet Rows      Most Recent Value   PT/OT G-Codes   Current Score  45   Projected Score  62   FOTO information reviewed  Yes   Assessment Type  Evaluation   G code set  Mobility: Walking & Moving Around   Mobility: Walking and Moving Around Current Status ()  CJ   Mobility: Walking and Moving Around Goal Status ()  CI          Precautions: HTN, CAD, DM    Daily Treatment Diary     Manual  10/15            Right gastroc, h/s, hip flexor str  Right pat  Mobs: all planes with medial tilt             IASTM to right patellar tendon             Reevaluation                              Exercise Diary  10/15            Bike             VG             Slantboard gastroc str  Hamstring, hip flexor str  SLR: supine, s/l             LSD             PB wall slides             Prone quad str               Standing hip abd, ext in SLS with TB             Sidestepping             Slideboard lunges: backwards             LAQ             Hip abduction clock             Leg press             Slideboard lunges: lateral                                                                                  Modalities

## 2018-10-18 ENCOUNTER — OFFICE VISIT (OUTPATIENT)
Dept: PHYSICAL THERAPY | Facility: REHABILITATION | Age: 73
End: 2018-10-18
Payer: OTHER GOVERNMENT

## 2018-10-18 DIAGNOSIS — M25.561 CHRONIC PAIN OF RIGHT KNEE: Primary | ICD-10-CM

## 2018-10-18 DIAGNOSIS — G89.29 CHRONIC PAIN OF RIGHT KNEE: Primary | ICD-10-CM

## 2018-10-18 PROCEDURE — 97140 MANUAL THERAPY 1/> REGIONS: CPT | Performed by: PHYSICAL THERAPIST

## 2018-10-18 PROCEDURE — 97110 THERAPEUTIC EXERCISES: CPT | Performed by: PHYSICAL THERAPIST

## 2018-10-18 NOTE — PROGRESS NOTES
Daily Note     Today's date: 10/18/2018  Patient name: Marci Herrera  :   MRN: [id number]  Referring provider: Alejandrina Gleason MD  Dx:   Encounter Diagnosis     ICD-10-CM    1  Chronic pain of right knee M25 561     G89 29        Start Time: 08  Stop Time: 1020  Total time in clinic (min): 85 minutes    Subjective: Patient reports that he woke up with some mild right knee pain this morning, but his left knee has been more bothersome  Objective: See treatment diary below      Assessment: Tolerated treatment fair  Patient demonstrated fatigue post treatment, exhibited good technique with therapeutic exercises and would benefit from continued PT  Patient limited by left knee pain with greatest pain with lunges  Patient with palpable bone spurs adjacent to patella medially, painful to palpation  Plan: Continue per plan of care  Progress treatment as tolerated  Precautions: HTN, CAD, DM    Daily Treatment Diary     Manual  10/15 10/18           Right gastroc, h/s, hip flexor str  GR           Right pat  Mobs: all planes with medial tilt  GR           IASTM to right patellar tendon             Reevaluation                              Exercise Diary  10/15 10/18           Bike  12'           VG  7' L7           Slantboard gastroc str  5x30"           Hamstring, hip flexor str  SLR: supine, s/l             LSD             PB wall slides             Prone quad str               Standing hip abd, ext in SLS with TB  2x10 YTB           Sidestepping             Slideboard lunges: backwards  3x10           LAQ             Hip abduction clock  2x5 YTB            Slideboard lunges: lateral                                                                                               Modalities

## 2018-10-22 ENCOUNTER — OFFICE VISIT (OUTPATIENT)
Dept: PHYSICAL THERAPY | Facility: REHABILITATION | Age: 73
End: 2018-10-22
Payer: OTHER GOVERNMENT

## 2018-10-22 DIAGNOSIS — M25.561 CHRONIC PAIN OF RIGHT KNEE: Primary | ICD-10-CM

## 2018-10-22 DIAGNOSIS — G89.29 CHRONIC PAIN OF RIGHT KNEE: Primary | ICD-10-CM

## 2018-10-22 PROCEDURE — 97140 MANUAL THERAPY 1/> REGIONS: CPT | Performed by: PHYSICAL THERAPIST

## 2018-10-22 PROCEDURE — 97110 THERAPEUTIC EXERCISES: CPT | Performed by: PHYSICAL THERAPIST

## 2018-10-22 NOTE — PROGRESS NOTES
Daily Note     Today's date: 10/22/2018  Patient name: Toma Alvarado  :   MRN: [id number]  Referring provider: Usama Piña MD  Dx:   Encounter Diagnosis     ICD-10-CM    1  Chronic pain of right knee M25 561     G89 29        Start Time: 1545  Stop Time: 1700  Total time in clinic (min): 75 minutes    Subjective: Patient reports that he had a f/u appointment with an ortho through the [place] who is ordering him to receive "gel shots"  Objective: See treatment diary below      Assessment: Tolerated treatment well  Patient demonstrated fatigue post treatment, exhibited good technique with therapeutic exercises and would benefit from continued PT  Patient with greater difficulty performing LSD off 6" box on left compared to right secondary to knee pain  Patient with fair eccentric control  Plan: Continue per plan of care  Progress treatment as tolerated  Precautions: HTN, CAD, DM    Daily Treatment Diary     Manual  10/15 10/18 10/22          Right gastroc, h/s, hip flexor str  GR GR          Right pat  Mobs: all planes with medial tilt  GR GR          IASTM to right patellar tendon             Reevaluation                              Exercise Diary  10/15 10/18 10/22          Bike  12' 12'          VG  7' L7 7' L7          Slantboard gastroc str  5x30" 5x30"          Hamstring, hip flexor str  SLR: supine, s/l             LSD   2x10 6" (R), 4" (L)          PB wall slides   15x          Prone quad str               Standing hip abd, ext in SLS with TB  2x10 YTB           Sidestepping             Slideboard lunges: backwards  3x10 3x10          LAQ             Hip abduction clock  2x5 YTB  2x5 YTB          Slideboard lunges: lateral                                                                                               Modalities

## 2018-10-24 ENCOUNTER — OFFICE VISIT (OUTPATIENT)
Dept: PHYSICAL THERAPY | Facility: REHABILITATION | Age: 73
End: 2018-10-24
Payer: OTHER GOVERNMENT

## 2018-10-24 DIAGNOSIS — M25.561 CHRONIC PAIN OF RIGHT KNEE: Primary | ICD-10-CM

## 2018-10-24 DIAGNOSIS — G89.29 CHRONIC PAIN OF RIGHT KNEE: Primary | ICD-10-CM

## 2018-10-24 PROCEDURE — 97140 MANUAL THERAPY 1/> REGIONS: CPT | Performed by: PHYSICAL THERAPIST

## 2018-10-24 PROCEDURE — 97110 THERAPEUTIC EXERCISES: CPT | Performed by: PHYSICAL THERAPIST

## 2018-10-24 NOTE — PROGRESS NOTES
Daily Note     Today's date: 10/24/2018  Patient name: Iam Linda  : [id number]  MRN: [id number]  Referring provider: Stan Neil MD  Dx:   Encounter Diagnosis     ICD-10-CM    1  Chronic pain of right knee M25 561     G89 29        Start Time: 1100  Stop Time: 1210  Total time in clinic (min): 70 minutes    Subjective: Patient reports that the left knee continues to be more painful than the right  Objective: See treatment diary below      Assessment: Tolerated treatment well  Patient demonstrated fatigue post treatment, exhibited good technique with therapeutic exercises and would benefit from continued PT  Patient with improved ability to descend 6" step, but did continue to have increased pain on left vs right leg  Patient continues to present with patellofemoral crepitus on left  Plan: Continue per plan of care  Progress treatment as tolerated  Precautions: HTN, CAD, DM    Daily Treatment Diary     Manual  10/15 10/18 10/22 10/24         Right gastroc, h/s, hip flexor str  GR GR GR         Right pat  Mobs: all planes with medial tilt  GR GR GR         IASTM to right patellar tendon             Reevaluation                              Exercise Diary  10/15 10/18 10/22 10/24         Bike  12' 12' 10'         VG  7' L7 7' L7 5' L7         Slantboard gastroc str  5x30" 5x30" 5x30"         Hamstring, hip flexor str  SLR: supine, s/l             LSD   2x10 6" (R), 4" (L) 2x10 6" b/l         PB wall slides   15x          Prone quad str  Standing hip abd, ext in SLS with TB  2x10 YTB  2x10 YTB Airex ea           Sidestepping             Slideboard lunges: backwards  3x10 3x10          LAQ             Hip abduction clock  2x5 YTB  2x5 YTB 3x5 YTB         Slideboard lunges: lateral    3x10                                                                                           Modalities

## 2018-10-29 ENCOUNTER — OFFICE VISIT (OUTPATIENT)
Dept: PHYSICAL THERAPY | Facility: REHABILITATION | Age: 73
End: 2018-10-29
Payer: OTHER GOVERNMENT

## 2018-10-29 DIAGNOSIS — M25.561 CHRONIC PAIN OF RIGHT KNEE: Primary | ICD-10-CM

## 2018-10-29 DIAGNOSIS — G89.29 CHRONIC PAIN OF RIGHT KNEE: Primary | ICD-10-CM

## 2018-10-29 PROCEDURE — 97110 THERAPEUTIC EXERCISES: CPT | Performed by: PHYSICAL THERAPIST

## 2018-10-29 PROCEDURE — 97140 MANUAL THERAPY 1/> REGIONS: CPT | Performed by: PHYSICAL THERAPIST

## 2018-10-29 NOTE — PROGRESS NOTES
Daily Note     Today's date: 10/29/2018  Patient name: Odalis Galeano  : [id number]  MRN: [id number]  Referring provider: Arlen Byrd MD  Dx:   Encounter Diagnosis     ICD-10-CM    1  Chronic pain of right knee M25 561     G89 29        Start Time: 1530  Stop Time: 1650  Total time in clinic (min): 80 minutes    Subjective: Patient reports that his knee pain is so so today  Objective: See treatment diary below      Assessment: Tolerated treatment well  Patient demonstrated fatigue post treatment, exhibited good technique with therapeutic exercises and would benefit from continued PT  Patient with no significant changes to overall status this visit  Patient with increased soreness at end of session  Plan: Continue per plan of care  Progress treatment as tolerated  Precautions: HTN, CAD, DM    Daily Treatment Diary     Manual  10/15 10/18 10/22 10/24 10/29        Right gastroc, h/s, hip flexor str  GR GR GR GR        Right pat  Mobs: all planes with medial tilt  GR GR GR GR        IASTM to right patellar tendon             Reevaluation                              Exercise Diary  10/15 10/18 10/22 10/24 10/29        Bike  12' 12' 10' 12'        VG  7' L7 7' L7 5' L7 U/l 3x10 L5 b/l        Slantboard gastroc str  5x30" 5x30" 5x30"         Hamstring, hip flexor str  SLR: supine, s/l     3x10 3# b/l ea  LSD   2x10 6" (R), 4" (L) 2x10 6" b/l         PB wall slides   15x          Prone quad str  Standing hip abd, ext in SLS with TB  2x10 YTB  2x10 YTB Airex ea           Sidestepping             Slideboard lunges: backwards  3x10 3x10  3x10        LAQ             Hip abduction clock  2x5 YTB  2x5 YTB 3x5 YTB         Slideboard lunges: lateral    3x10                                                                                           Modalities

## 2018-10-31 ENCOUNTER — OFFICE VISIT (OUTPATIENT)
Dept: PHYSICAL THERAPY | Facility: REHABILITATION | Age: 73
End: 2018-10-31
Payer: OTHER GOVERNMENT

## 2018-10-31 DIAGNOSIS — M25.561 CHRONIC PAIN OF RIGHT KNEE: Primary | ICD-10-CM

## 2018-10-31 DIAGNOSIS — G89.29 CHRONIC PAIN OF RIGHT KNEE: Primary | ICD-10-CM

## 2018-10-31 PROCEDURE — 97140 MANUAL THERAPY 1/> REGIONS: CPT

## 2018-10-31 PROCEDURE — 97110 THERAPEUTIC EXERCISES: CPT

## 2018-10-31 NOTE — PROGRESS NOTES
Daily Note     Today's date: 10/31/2018  Patient name: Gennyrmya Ott  :   MRN: [id number]  Referring provider: Merissa Peralta MD  Dx:   Encounter Diagnosis     ICD-10-CM    1  Chronic pain of right knee M25 561     G89 29      1:1 with PTA CR 8:50-9:30  Unbilled 9:30- 10:00              Subjective: Continued pain and difficulty with navigating steps  Objective: See treatment diary below      Assessment: Tolerated treatment well  Patient demonstrated fatigue post treatment, exhibited good technique with therapeutic exercises and would benefit from continued PT  Continues with limited right LE flexibility especially hamstrings  Poor eccentric control requiring cues during SLR flexion and abduction  Plan: Continue per plan of care  Progress treatment as tolerated  Precautions: HTN, CAD, DM    Daily Treatment Diary     Manual  10/15 10/18 10/22 10/24 10/29 10/31       Right gastroc, h/s, hip flexor str  GR GR GR GR 13 mins       Right pat  Mobs: all planes with medial tilt  GR GR GR GR 2 mins       IASTM to right patellar tendon             Reevaluation                              Exercise Diary  10/15 10/18 10/22 10/24 10/29 10/31       Bike  12' 12' 10' 12' 10'       VG supine  7' L7 7' L7 5' L7 U/l 3x10 L5 b/l U/L  3x10  L5 B/L       Slantboard gastroc str  5x30" 5x30" 5x30"         Hamstring, hip flexor str  SLR: supine, s/l     3x10 3# b/l ea  3#  3x10 ea  B/L       LSD   2x10 6" (R), 4" (L) 2x10 6" b/l         PB wall slides   15x          Prone quad str  Standing hip abd, ext in SLS with TB  2x10 YTB  2x10 YTB Airex ea           Sidestepping             Slideboard lunges: backwards  3x10 3x10  3x10 3x10       LAQ             Hip abduction clock  2x5 YTB  2x5 YTB 3x5 YTB         Slideboard lunges: lateral    3x10  3x10                                                                                         Modalities

## 2018-11-05 ENCOUNTER — APPOINTMENT (OUTPATIENT)
Dept: PHYSICAL THERAPY | Facility: REHABILITATION | Age: 73
End: 2018-11-05
Payer: OTHER GOVERNMENT

## 2018-11-07 ENCOUNTER — OFFICE VISIT (OUTPATIENT)
Dept: PHYSICAL THERAPY | Facility: REHABILITATION | Age: 73
End: 2018-11-07
Payer: OTHER GOVERNMENT

## 2018-11-07 DIAGNOSIS — M25.561 CHRONIC PAIN OF RIGHT KNEE: Primary | ICD-10-CM

## 2018-11-07 DIAGNOSIS — G89.29 CHRONIC PAIN OF RIGHT KNEE: Primary | ICD-10-CM

## 2018-11-07 PROCEDURE — 97110 THERAPEUTIC EXERCISES: CPT | Performed by: PHYSICAL THERAPIST

## 2018-11-07 PROCEDURE — 97112 NEUROMUSCULAR REEDUCATION: CPT | Performed by: PHYSICAL THERAPIST

## 2018-11-07 NOTE — PROGRESS NOTES
Daily Note     Today's date: 2018  Patient name: Bharathi Ware  :   MRN: [id number]  Referring provider: Brad Muse MD  Dx:   Encounter Diagnosis     ICD-10-CM    1  Chronic pain of right knee M25 561     G89 29        Start Time: 0850  Stop Time: 0945  Total time in clinic (min): 55 minutes    Subjective: Patient reports that his knees feel less achy this morning  Objective: See treatment diary below      Assessment: Tolerated treatment well  Patient demonstrated fatigue post treatment, exhibited good technique with therapeutic exercises and would benefit from continued PT  Patient with difficulty performing unilateral stand to sits with worse eccentric control on left vs right  Plan: Continue per plan of care  Potential discharge next visit  Precautions: HTN, CAD, DM    Daily Treatment Diary     Manual  10/15 10/18 10/22 10/24 10/29 10/31 11/7      Right gastroc, h/s, hip flexor str  GR GR GR GR 13 mins       Right pat  Mobs: all planes with medial tilt  GR GR GR GR 2 mins       IASTM to right patellar tendon             Reevaluation                              Exercise Diary  10/15 10/18 10/22 10/24 10/29 10/31 11/7      Bike  12' 12' 10' 12' 10' 5'      VG supine  7' L7 7' L7 5' L7 U/l 3x10 L5 b/l U/L  3x10  L5 B/L U/L 3' L5 b/l      Slantboard gastroc str  5x30" 5x30" 5x30"         Hamstring, hip flexor str  SLR: supine, s/l     3x10 3# b/l ea  3#  3x10 ea  B/L       LSD   2x10 6" (R), 4" (L) 2x10 6" b/l         PB wall slides   15x          Prone quad str  Standing hip abd, ext in SLS with TB  2x10 YTB  2x10 YTB Airex ea  2x10 GTB Airex ea        Sidestepping             Slideboard lunges: backwards  3x10 3x10  3x10 3x10 3x10      LAQ             Hip abduction clock  2x5 YTB  2x5 YTB 3x5 YTB   3x5 GTB      Slideboard lunges: lateral    3x10  3x10 3x10      Eccentric s/l squats at plinthe       2x5      Bosu step-ups       2x10 Modalities

## 2018-11-09 ENCOUNTER — OFFICE VISIT (OUTPATIENT)
Dept: PHYSICAL THERAPY | Facility: REHABILITATION | Age: 73
End: 2018-11-09
Payer: OTHER GOVERNMENT

## 2018-11-09 DIAGNOSIS — M25.561 CHRONIC PAIN OF RIGHT KNEE: Primary | ICD-10-CM

## 2018-11-09 DIAGNOSIS — G89.29 CHRONIC PAIN OF RIGHT KNEE: Primary | ICD-10-CM

## 2018-11-09 PROCEDURE — 97110 THERAPEUTIC EXERCISES: CPT | Performed by: PHYSICAL THERAPIST

## 2018-11-09 NOTE — PROGRESS NOTES
Daily Note     Today's date: 2018  Patient name: Jesi Benton  :   MRN: [id number]  Referring provider: Isra Waldron MD  Dx:   Encounter Diagnosis     ICD-10-CM    1  Chronic pain of right knee M25 561     G89 29        Start Time: 1122  Stop Time: 1205  Total time in clinic (min): 43 minutes    Subjective: Patient reports that he was out walking in [facility] yesterday and had minimal increase pain from being on his feet, but steps continue to be difficult and painful  Objective: See treatment diary below      Assessment: Tolerated treatment well  Patient demonstrated fatigue post treatment, exhibited good technique with therapeutic exercises and would benefit from continued PT  Patient continues to have difficulty performing eccentric stand to sits on left > right secondary to increased pain  Fair eccentric control noted on right vs poor on left  Plan: Continue per plan of care  Progress treatment as tolerated  Precautions: HTN, CAD, DM    Daily Treatment Diary     Manual  10/15 10/18 10/22 10/24 10/29 10/31 11/7 11     Right gastroc, h/s, hip flexor str  GR GR GR GR 13 mins       Right pat  Mobs: all planes with medial tilt  GR GR GR GR 2 mins       IASTM to right patellar tendon             Reevaluation                              Exercise Diary  10/15 10/18 10/22 10/24 10/29 10/31 11/7 119     Bike  12' 12' 10' 12' 10' 5' 5'     VG supine  7' L7 7' L7 5' L7 U/l 3x10 L5 b/l U/L  3x10  L5 B/L U/L 3' L5 b/l U/L 3' L5 b/l     Slantboard gastroc str  5x30" 5x30" 5x30"         Hamstring, hip flexor str  SLR: supine, s/l     3x10 3# b/l ea  3#  3x10 ea  B/L       LSD   2x10 6" (R), 4" (L) 2x10 6" b/l    2x10 6"     PB wall slides   15x          Prone quad str  Standing hip abd, ext in SLS with TB  2x10 YTB  2x10 YTB Airex ea  2x10 GTB Airex ea        Sidestepping             Slideboard lunges: backwards  3x10 3x10  3x10 3x10 3x10      LAQ Hip abduction clock  2x5 YTB  2x5 YTB 3x5 YTB   3x5 GTB      Slideboard lunges: lateral    3x10  3x10 3x10      Eccentric s/l squats at plinthe       2x5 3x5     Bosu step-ups       2x10 2x10                                                             Modalities

## 2018-11-12 ENCOUNTER — OFFICE VISIT (OUTPATIENT)
Dept: PHYSICAL THERAPY | Facility: REHABILITATION | Age: 73
End: 2018-11-12
Payer: OTHER GOVERNMENT

## 2018-11-12 DIAGNOSIS — M25.561 CHRONIC PAIN OF RIGHT KNEE: Primary | ICD-10-CM

## 2018-11-12 DIAGNOSIS — G89.29 CHRONIC PAIN OF RIGHT KNEE: Primary | ICD-10-CM

## 2018-11-12 PROCEDURE — 97110 THERAPEUTIC EXERCISES: CPT | Performed by: PHYSICAL THERAPIST

## 2018-11-12 PROCEDURE — 97112 NEUROMUSCULAR REEDUCATION: CPT | Performed by: PHYSICAL THERAPIST

## 2018-11-12 NOTE — PROGRESS NOTES
Daily Note     Today's date: 2018  Patient name: Glen Cha  : [birth year]  MRN: [id number]  Referring provider: Kareem Vyas MD  Dx:   Encounter Diagnosis     ICD-10-CM    1  Chronic pain of right knee M25 561     G89 29        Start Time: 1515  Stop Time: 1630  Total time in clinic (min): 75 minutes    Subjective: Patient reports no significant changes to overall status since his previous treatment session  Objective: See treatment diary below      Assessment: Tolerated treatment fair  Patient demonstrated fatigue post treatment and would benefit from continued PT  No significant changes to overall status since previous treatment session  Patient continues to have limited eccentric control with stand to sit  Plan: Continue per plan of care  Progress treatment as tolerated  Precautions: HTN, CAD, DM    Daily Treatment Diary     Manual  10/15 10/18 10/22 10/24 10/29 10/31 11/7 11/9 11/12    Right gastroc, h/s, hip flexor str  GR GR GR GR 13 mins       Right pat  Mobs: all planes with medial tilt  GR GR GR GR 2 mins       IASTM to right patellar tendon             Reevaluation                              Exercise Diary  10/15 10/18 10/22 10/24 10/29 10/31 11/7 11/9 11/12    Bike  12' 12' 10' 12' 10' 5' 5' 10'    VG supine  7' L7 7' L7 5' L7 U/l 3x10 L5 b/l U/L  3x10  L5 B/L U/L 3' L5 b/l U/L 3' L5 b/l U/L 3' L5 b/l    Slantboard gastroc str  5x30" 5x30" 5x30"         Prone hip flexor str  5x30"    SLR: supine, s/l     3x10 3# b/l ea  3#  3x10 ea  B/L       LSD   2x10 6" (R), 4" (L) 2x10 6" b/l    2x10 6" 2x10 6" (L) ; 2x10 8" (R)    PB wall slides   15x                       Standing hip abd, ext in SLS with TB  2x10 YTB  2x10 YTB Airex ea  2x10 GTB Airex ea        Sidestepping             Slideboard lunges: backwards  3x10 3x10  3x10 3x10 3x10      LAQ             Hip abduction clock  2x5 YTB  2x5 YTB 3x5 YTB   3x5 GTB  3x5 GTB    Slideboard lunges: lateral    3x10  3x10 3x10      Eccentric s/l squats at plinthe       2x5 3x5 3x5    Bosu step-ups       2x10 2x10 3x10                                                            Modalities

## 2018-11-14 ENCOUNTER — OFFICE VISIT (OUTPATIENT)
Dept: PHYSICAL THERAPY | Facility: REHABILITATION | Age: 73
End: 2018-11-14
Payer: OTHER GOVERNMENT

## 2018-11-14 DIAGNOSIS — G89.29 CHRONIC PAIN OF RIGHT KNEE: Primary | ICD-10-CM

## 2018-11-14 DIAGNOSIS — M25.561 CHRONIC PAIN OF RIGHT KNEE: Primary | ICD-10-CM

## 2018-11-14 PROCEDURE — G8979 MOBILITY GOAL STATUS: HCPCS | Performed by: PHYSICAL THERAPIST

## 2018-11-14 PROCEDURE — G8978 MOBILITY CURRENT STATUS: HCPCS | Performed by: PHYSICAL THERAPIST

## 2018-11-14 PROCEDURE — 97110 THERAPEUTIC EXERCISES: CPT | Performed by: PHYSICAL THERAPIST

## 2018-11-14 NOTE — PROGRESS NOTES
Daily Note     Today's date: 2018  Patient name: Danni Estrada  : [birth year]  MRN: [id number]  Referring provider: Harleen Kerr MD  Dx:   Encounter Diagnosis     ICD-10-CM    1  Chronic pain of right knee M25 561     G89 29        Start Time: 0840  Stop Time: 1000  Total time in clinic (min): 80 minutes    Subjective: Patient reports that he will be going away for Thanksgiving and will be out of therapy for a week  Patient reports that his left knee remains more achy than his right  Objective: See treatment diary below      Assessment: Tolerated treatment fair  Patient demonstrated fatigue post treatment and would benefit from continued PT  No significant changes to overall status this visit  Patient continues to present with left > right LE weakness with increased pain during eccentric knee extension  Plan: Continue per plan of care  Progress treatment as tolerated  Precautions: HTN, CAD, DM    Daily Treatment Diary     Manual  10/15 10/18 10/22 10/24 10/29 10/31 11/7 11/9 11/12 11/14   Right gastroc, h/s, hip flexor str  GR GR GR GR 13 mins       Right pat  Mobs: all planes with medial tilt  GR GR GR GR 2 mins       IASTM to right patellar tendon             Reevaluation                              Exercise Diary  10/15 10/18 10/22 10/24 10/29 10/31 11/7 11/9 11/12 11/14   Bike  12' 12' 10' 12' 10' 5' 5' 10' 10'   VG supine  7' L7 7' L7 5' L7 U/l 3x10 L5 b/l U/L  3x10  L5 B/L U/L 3' L5 b/l U/L 3' L5 b/l U/L 3' L5 b/l U/L 3' L5 b/l   Slantboard gastroc str  5x30" 5x30" 5x30"      5x30"   Prone hip flexor str  5x30" 5x30"   SLR: supine, s/l     3x10 3# b/l ea  3#  3x10 ea  B/L       LSD   2x10 6" (R), 4" (L) 2x10 6" b/l    2x10 6" 2x10 6" (L) ; 2x10 8" (R) 2x10 6" (L) ; 2x10 8" (R)   PB wall slides   15x                       Standing hip abd, ext in SLS with TB  2x10 YTB  2x10 YTB Airex ea  2x10 GTB Airex ea        Sidestepping             Slideboard lunges: backwards 3x10 3x10  3x10 3x10 3x10   3x10   LAQ             Hip abduction clock  2x5 YTB  2x5 YTB 3x5 YTB   3x5 GTB  3x5 GTB    Slideboard lunges: lateral    3x10  3x10 3x10      Eccentric s/l squats at plinthe       2x5 3x5 3x5    Bosu step-ups       2x10 2x10 3x10              5x30"                                              Modalities

## 2018-12-05 ENCOUNTER — APPOINTMENT (OUTPATIENT)
Dept: PHYSICAL THERAPY | Facility: REHABILITATION | Age: 73
End: 2018-12-05
Payer: OTHER GOVERNMENT

## 2018-12-10 ENCOUNTER — EVALUATION (OUTPATIENT)
Dept: PHYSICAL THERAPY | Facility: REHABILITATION | Age: 73
End: 2018-12-10
Payer: OTHER GOVERNMENT

## 2018-12-10 DIAGNOSIS — G89.29 CHRONIC PAIN OF RIGHT KNEE: Primary | ICD-10-CM

## 2018-12-10 DIAGNOSIS — M25.561 CHRONIC PAIN OF RIGHT KNEE: Primary | ICD-10-CM

## 2018-12-10 PROCEDURE — 97110 THERAPEUTIC EXERCISES: CPT | Performed by: PHYSICAL THERAPIST

## 2018-12-10 NOTE — PROGRESS NOTES
Daily Note     Today's date: 12/10/2018  Patient name: Iam Linda  :   MRN: [id number]  Referring provider: Stan Neil MD  Dx:   Encounter Diagnosis     ICD-10-CM    1  Chronic pain of right knee M25 561     G89 29        Start Time: 1155  Stop Time: 1255  Total time in clinic (min): 60 minutes    Subjective: Patient reports that he has an appointment with his physician later in the week and anticipates receiving a gel shot  Patient states that he is tired this morning from just waking up  Objective: See treatment diary below      Assessment: Tolerated treatment well  Patient demonstrated fatigue post treatment and would benefit from continued PT  Patient demonstrated improved eccentric control with stand to sits and reported decreased pain  Plan: Continue per plan of care  Progress note during next visit  Progress treatment as tolerated  Precautions: HTN, CAD, DM    Daily Treatment Diary     Manual  12/10        11/12 11/14   Right gastroc, h/s, hip flexor str  Right pat  Mobs: all planes with medial tilt             IASTM to right patellar tendon             Reevaluation                              Exercise Diary  12/10         11/14   Bike 7'         10'   VG supine 3' L6         U/L 3' L5 b/l   Slantboard gastroc str  5x30"         5x30"   Prone hip flexor str            5x30"   SLR: supine, s/l             LSD          2x10 6" (L) ; 2x10 8" (R)   PB wall slides                          Standing hip abd, ext in SLS with TB             Sidestepping 30'x4 YTB            Slideboard lunges: backwards          3x10   LAQ             Hip abduction clock 3x5 YTB            Slideboard lunges: lateral             Eccentric s/l squats at plinthe 3x5 b/l            Bosu step-ups 2x10 b/l                      5x30"                                              Modalities

## 2018-12-10 NOTE — PROGRESS NOTES
{SL AMB PT/OT NOTE [id number]}    Today's date: 12/10/2018  Patient name: Almaz Nogueira  : [birth year]  MRN: [id number]  Referring provider: Maurice López MD  Dx:   Encounter Diagnosis     ICD-10-CM    1   Chronic pain of right knee M25 561     G89 29                   Assessment/Plan    Subjective    Objective        Precautions: ***    Daily Treatment Diary     Manual                                                                                   Exercise Diary                                                                                                                                                                                                                                                                                      Modalities

## 2018-12-17 ENCOUNTER — EVALUATION (OUTPATIENT)
Dept: PHYSICAL THERAPY | Facility: REHABILITATION | Age: 73
End: 2018-12-17
Payer: OTHER GOVERNMENT

## 2018-12-17 DIAGNOSIS — M25.561 CHRONIC PAIN OF RIGHT KNEE: Primary | ICD-10-CM

## 2018-12-17 DIAGNOSIS — G89.29 CHRONIC PAIN OF RIGHT KNEE: Primary | ICD-10-CM

## 2018-12-17 PROCEDURE — 97140 MANUAL THERAPY 1/> REGIONS: CPT | Performed by: PHYSICAL THERAPIST

## 2018-12-17 PROCEDURE — G8980 MOBILITY D/C STATUS: HCPCS | Performed by: PHYSICAL THERAPIST

## 2018-12-17 PROCEDURE — 97110 THERAPEUTIC EXERCISES: CPT | Performed by: PHYSICAL THERAPIST

## 2018-12-17 PROCEDURE — G8979 MOBILITY GOAL STATUS: HCPCS | Performed by: PHYSICAL THERAPIST

## 2018-12-17 NOTE — PROGRESS NOTES
PT Discharge    Today's date: 2018  Patient name: Ivett Sarah  :   MRN: [id number]  Referring provider: Didier Hendricks MD  Dx:   Encounter Diagnosis     ICD-10-CM    1  Chronic pain of right knee M25 561     G89 29        Start Time: 1400  Stop Time: 1450  Total time in clinic (min): 50 minutes    Assessment  Assessment details: Since beginning physical therapy, Lanre Foss has attended a total number of 12 visits and has maintained excellent compliance with established POC  Patient has made significant improvements in all areas, including decreased pain, increased strength and improved overall level of function  Patient is reporting improved ability to perform a/iadls and engaging in social activities  Patient is independent with comprehensive HEP  Despite these improvements, Patient continues to report significant patellofemoral bilateral knee pain which limits his ability to descend steps and perform standing activities  Patient has been instructed to contact PT if he begins to notice a decline in function or has any questions or concerns  Patient will be discharged at this time  Patient is in agreement with plan  Impairments: activity intolerance and pain with function  Understanding of Dx/Px/POC: good   Prognosis: good    Goals  Short Term Goals: to be achieved in 4 weeks  1) Patient to be independent with basic HEP  2) Decrease pain at it's worst to 5/10 on VAS  3) Increase LE strength by 1/2 MMT grade in all deficient planes  4) Patient to report decreased sleep interruption secondary to pain  5) Patient to negotiate steps with a step-to pattern with use of HR  6) Increase ambulatory tolerance to 30 minutes  Long Term Goals: to be achieved by discharge  1) FOTO equal to or greater than 57   2) Patient to be independent with comprehensive HEP  3) Abolish pain for improved quality of life  4) Increase LE strength to 5/5 MMT grade in all planes to improve A/IADLS    5) Patient to negotiate steps with a reciprocal pattern without use of Hr  6) Increase ambulatory tolerance to 45 minutes  7) Patient to report no sleep interruption secondary to pain  Plan  Planned therapy interventions: home exercise program  Treatment plan discussed with: patient        Subjective Evaluation    History of Present Illness  Mechanism of injury: Patient reports significant improvement of b/l knee pain and improved overall activity and ambulatory tolerance  Patient's primary complaint at this time is continued knee pain with negotiating steps and performing sit to/from stands  Patient is reporting no improvement following his gel shots  Pain  Current pain ratin  At best pain ratin  At worst pain ratin  Location: left > right medial knee  Quality: dull ache    Treatments  Current treatment: physical therapy  Patient Goals  Patient goals for therapy: decreased pain, increased motion, independence with ADLs/IADLs, increased strength and return to sport/leisure activities          Objective     Active Range of Motion   Left Knee   Normal active range of motion    Right Knee   Normal active range of motion    Strength/Myotome Testing     Left Hip   Planes of Motion   Flexion: 5  Extension: 4+    Right Hip   Planes of Motion   Flexion: 4+ (+) pain  Extension: 4+  Abduction: 5    Left Knee   Flexion: 5  Extension: 4+ (+) pain    Right Knee   Flexion: 5  Extension: 5    Left Ankle/Foot   Dorsiflexion: 5    Right Ankle/Foot   Dorsiflexion: 5    Ambulation   Weight-Bearing Status   Weight-Bearing Status (Left): full weight bearing   Weight-Bearing Status (Right): full weight-bearing      Observational Gait   Gait: within functional limits     Functional Assessment   Squat   Left within functional limits and right within functional limits         Flowsheet Rows      Most Recent Value   PT/OT G-Codes   Current Score  46   Projected Score  47   FOTO information reviewed  Yes   Assessment Type  Discharge   G code set  Mobility: Walking & Moving Around   Mobility: Walking and Moving Around Goal Status ()  CI   Mobility: Walking and Moving Around Discharge Status ()  CJ          Precautions: HTN, CAD, DM    Daily Treatment Diary     Manual  12/10 12/17           Right gastroc, h/s, hip flexor str  Right pat  Mobs: all planes with medial tilt             IASTM to right patellar tendon             Reevaluation  GR                            Exercise Diary  12/10 12/17           Bike 7' 10'           VG supine 3' L6            Slantboard gastroc str  5x30"            Prone hip flexor str               SLR: supine, s/l             LSD             PB wall slides                          Standing hip abd, ext in SLS with TB             Sidestepping 30'x4 YTB            Slideboard lunges: backwards             LAQ             Hip abduction clock 3x5 YTB            Slideboard lunges: lateral             Eccentric s/l squats at plinthe 3x5 b/l            Bosu step-ups 2x10 b/l                                                                    Modalities

## 2020-02-15 ENCOUNTER — HOSPITAL ENCOUNTER (EMERGENCY)
Facility: HOSPITAL | Age: 75
Discharge: HOME | End: 2020-02-16
Attending: EMERGENCY MEDICINE
Payer: COMMERCIAL
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
## 2020-02-15 PROCEDURE — 99284 EMERGENCY DEPT VISIT MOD MDM: CPT | Performed by: EMERGENCY MEDICINE

## 2020-02-15 PROCEDURE — 99284 EMERGENCY DEPT VISIT MOD MDM: CPT

## 2020-02-16 ENCOUNTER — APPOINTMENT (EMERGENCY)
Dept: CT IMAGING | Facility: HOSPITAL | Age: 75
End: 2020-02-16
Payer: COMMERCIAL

## 2020-02-16 VITALS
WEIGHT: 184 LBS | OXYGEN SATURATION: 93 % | HEART RATE: 72 BPM | RESPIRATION RATE: 15 BRPM | DIASTOLIC BLOOD PRESSURE: 87 MMHG | SYSTOLIC BLOOD PRESSURE: 166 MMHG | TEMPERATURE: 98.1 F

## 2020-02-16 LAB
ANION GAP SERPL CALCULATED.3IONS-SCNC: 7 MMOL/L (ref 4–13)
ATRIAL RATE: 80 BPM
BASOPHILS # BLD AUTO: 0.03 THOUSANDS/ΜL (ref 0–0.1)
BASOPHILS NFR BLD AUTO: 1 % (ref 0–1)
BUN SERPL-MCNC: 24 MG/DL (ref 5–25)
CALCIUM SERPL-MCNC: 8.4 MG/DL (ref 8.3–10.1)
CHLORIDE SERPL-SCNC: 104 MMOL/L (ref 100–108)
CO2 SERPL-SCNC: 30 MMOL/L (ref 21–32)
CREAT SERPL-MCNC: 1.45 MG/DL (ref 0.6–1.3)
EOSINOPHIL # BLD AUTO: 0.2 THOUSAND/ΜL (ref 0–0.61)
EOSINOPHIL NFR BLD AUTO: 3 % (ref 0–6)
ERYTHROCYTE [DISTWIDTH] IN BLOOD BY AUTOMATED COUNT: 14.5 % (ref 11.6–15.1)
GFR SERPL CREATININE-BSD FRML MDRD: 47 ML/MIN/1.73SQ M
GLUCOSE SERPL-MCNC: 124 MG/DL (ref 65–140)
HCT VFR BLD AUTO: 49.7 % (ref 36.5–49.3)
HGB BLD-MCNC: 16.2 G/DL (ref 12–17)
IMM GRANULOCYTES # BLD AUTO: 0.01 THOUSAND/UL (ref 0–0.2)
IMM GRANULOCYTES NFR BLD AUTO: 0 % (ref 0–2)
LYMPHOCYTES # BLD AUTO: 1.52 THOUSANDS/ΜL (ref 0.6–4.47)
LYMPHOCYTES NFR BLD AUTO: 24 % (ref 14–44)
MCH RBC QN AUTO: 29.3 PG (ref 26.8–34.3)
MCHC RBC AUTO-ENTMCNC: 32.6 G/DL (ref 31.4–37.4)
MCV RBC AUTO: 90 FL (ref 82–98)
MONOCYTES # BLD AUTO: 0.48 THOUSAND/ΜL (ref 0.17–1.22)
MONOCYTES NFR BLD AUTO: 7 % (ref 4–12)
NEUTROPHILS # BLD AUTO: 4.22 THOUSANDS/ΜL (ref 1.85–7.62)
NEUTS SEG NFR BLD AUTO: 65 % (ref 43–75)
NRBC BLD AUTO-RTO: 0 /100 WBCS
P AXIS: 56 DEGREES
PLATELET # BLD AUTO: 188 THOUSANDS/UL (ref 149–390)
PMV BLD AUTO: 9.1 FL (ref 8.9–12.7)
POTASSIUM SERPL-SCNC: 4.1 MMOL/L (ref 3.5–5.3)
PR INTERVAL: 194 MS
QRS AXIS: -63 DEGREES
QRSD INTERVAL: 122 MS
QT INTERVAL: 386 MS
QTC INTERVAL: 445 MS
RBC # BLD AUTO: 5.53 MILLION/UL (ref 3.88–5.62)
SODIUM SERPL-SCNC: 141 MMOL/L (ref 136–145)
T WAVE AXIS: 62 DEGREES
VENTRICULAR RATE: 80 BPM
WBC # BLD AUTO: 6.46 THOUSAND/UL (ref 4.31–10.16)

## 2020-02-16 PROCEDURE — 93010 ELECTROCARDIOGRAM REPORT: CPT | Performed by: INTERNAL MEDICINE

## 2020-02-16 PROCEDURE — 72125 CT NECK SPINE W/O DYE: CPT

## 2020-02-16 PROCEDURE — 70450 CT HEAD/BRAIN W/O DYE: CPT

## 2020-02-16 PROCEDURE — 85025 COMPLETE CBC W/AUTO DIFF WBC: CPT | Performed by: EMERGENCY MEDICINE

## 2020-02-16 PROCEDURE — 93005 ELECTROCARDIOGRAM TRACING: CPT

## 2020-02-16 PROCEDURE — 80048 BASIC METABOLIC PNL TOTAL CA: CPT | Performed by: EMERGENCY MEDICINE

## 2020-02-16 PROCEDURE — 36415 COLL VENOUS BLD VENIPUNCTURE: CPT | Performed by: EMERGENCY MEDICINE

## 2020-02-16 NOTE — ED PROVIDER NOTES
History  Chief Complaint   Patient presents with    Fall     Pt presents to the ED with c/o a fall and head injury that happened about 1 hour ago  Pt states that he became unsteady on his feet and fell  Pt reports that his PCP recently changed one of his medicines but he is uinsure which medication     HPI    Patient is a 76year old male who presents with a fall, while carrying a suitcase, he was also going over a curb  Patient notes chronic instability  He fell backwards, hit his head  Abrasion to the head  Reports being UTD on tetanus  No vomiting  No headache  No midline c/t/l spine tenderness  Trauma exam performed: GCS 15, full ROM of bilateral upper and lower extremities  Airway intact, bilateral breath sounds, palpable pulses  No active bleeding  No bony point tenderness in extremities, chest, abdomen or c/t,l spine  No crepitus, abdomen soft/non tender  Chest wall soft non tender with no deformities  Pelvis stable  MDM fall, noted some abrasion to head, will check labs to rule out electrolyte abnormality, if all good will dc  Discussed with the patient who agrees with the workup and plan, understands return precautions and followup instructions  Prior to Admission Medications   Prescriptions Last Dose Informant Patient Reported? Taking?    ALBUTEROL IN   Yes No   Sig: Inhale   Empagliflozin 10 MG TABS   Yes No   Sig: Take 10 mg by mouth every morning   alendronate-cholecalciferol (FOSAMAX PLUS D)  MG-UNIT per tablet   Yes No   Sig: Take 1 tablet by mouth once a week   aspirin (ECOTRIN LOW STRENGTH) 81 mg EC tablet   Yes No   Sig: Take 81 mg by mouth daily   atorvastatin (LIPITOR) 80 mg tablet   Yes No   Sig: Take 80 mg by mouth daily   budesonide-formoterol (SYMBICORT) 160-4 5 mcg/act inhaler   Yes No   Sig: Inhale 2 puffs 2 (two) times a day Rinse mouth after use    carboxymethylcellulose 0 5 % SOLN   Yes No   Si drop 3 (three) times a day as needed for dry eyes   cyanocobalamin 500 MCG tablet   Yes No   Sig: Take 500 mcg by mouth daily   dextrose 40 % SOLN   Yes No   Sig: Infuse into a venous catheter   ezetimibe (ZETIA) 10 mg tablet   Yes No   Sig: Take 10 mg by mouth daily   gabapentin (NEURONTIN) 100 mg capsule   Yes No   Sig: Take 100 mg by mouth 3 (three) times a day   glucose 4 g chewable tablet   Yes No   Sig: Chew 16 g as needed for low blood sugar   insulin aspart (NovoLOG) 100 units/mL injection   Yes No   Sig: Inject under the skin 3 (three) times a day before meals   insulin glargine (LANTUS) 100 units/mL subcutaneous injection   Yes No   Sig: Inject under the skin daily at bedtime   loratadine (CLARITIN) 10 mg tablet   Yes No   Sig: Take 10 mg by mouth daily   losartan (COZAAR) 100 MG tablet   Yes No   Sig: Take 100 mg by mouth daily   magnesium oxide (MAG-OX) 400 mg   Yes No   Sig: Take 400 mg by mouth 2 (two) times a day   metFORMIN (GLUCOPHAGE) 1000 MG tablet   Yes No   Sig: Take 1,000 mg by mouth 2 (two) times a day with meals   sertraline (ZOLOFT) 100 mg tablet   Yes No   Sig: Take 50 mg by mouth daily   tamsulosin (FLOMAX) 0 4 mg   Yes No   Sig: Take 0 4 mg by mouth daily with dinner      Facility-Administered Medications: None       Past Medical History:   Diagnosis Date    [facility]     Coronary artery disease     Diabetes mellitus ([facility] )     Hypertension        Past Surgical History:   Procedure Laterality Date    CORONARY ARTERY BYPASS GRAFT      HERNIA REPAIR      KNEE SURGERY Left        History reviewed  No pertinent family history  I have reviewed and agree with the history as documented  Social History     Tobacco Use    Smoking status: Former Smoker    Smokeless tobacco: Never Used   Substance Use Topics    Alcohol use: Never     Frequency: Never    Drug use: Never       Review of Systems   Skin: Positive for wound  All other systems reviewed and are negative        Physical Exam  Physical Exam   Constitutional: He is oriented to person, place, and time  He appears well-developed and well-nourished  HENT:   Head: Normocephalic and atraumatic  Eyes: Pupils are equal, round, and reactive to light  EOM are normal    Neck: Normal range of motion  Neck supple  Cardiovascular: Normal rate, regular rhythm and normal heart sounds  No murmur heard  Pulmonary/Chest: Effort normal and breath sounds normal  No respiratory distress  He has no wheezes  Abdominal: Soft  Bowel sounds are normal  He exhibits no distension  There is no tenderness  Musculoskeletal: Normal range of motion  He exhibits no edema or tenderness  Neurological: He is alert and oriented to person, place, and time  No cranial nerve deficit  Coordination normal    Skin: Skin is warm and dry  He is not diaphoretic  No erythema  Abrasion to posterior scalp   Psychiatric: He has a normal mood and affect  His behavior is normal    Nursing note and vitals reviewed        Vital Signs  ED Triage Vitals [02/15/20 2318]   Temperature Pulse Respirations Blood Pressure SpO2   98 1 °F (36 7 °C) 80 16 (!) 182/84 95 %      Temp Source Heart Rate Source Patient Position - Orthostatic VS BP Location FiO2 (%)   Oral Monitor Sitting Left arm --      Pain Score       No Pain           Vitals:    02/15/20 2318 02/16/20 0143   BP: (!) 182/84 166/87   Pulse: 80 72   Patient Position - Orthostatic VS: Sitting Lying         Visual Acuity      ED Medications  Medications - No data to display    Diagnostic Studies  Results Reviewed     Procedure Component Value Units Date/Time    Basic metabolic panel [[id number]]  (Abnormal) Collected:  02/16/20 0120    Lab Status:  Final result Specimen:  Blood from Arm, Right Updated:  02/16/20 0151     Sodium 141 mmol/L      Potassium 4 1 mmol/L      Chloride 104 mmol/L      CO2 30 mmol/L      ANION GAP 7 mmol/L      BUN 24 mg/dL      Creatinine 1 45 mg/dL      Glucose 124 mg/dL      Calcium 8 4 mg/dL eGFR 47 ml/min/1 73sq m     Narrative:       [place] guidelines for Chronic Kidney Disease (CKD):     Stage 1 with normal or high GFR (GFR > 90 mL/min/1 73 square meters)    Stage 2 Mild CKD (GFR = 60-89 mL/min/1 73 square meters)    Stage 3A Moderate CKD (GFR = 45-59 mL/min/1 73 square meters)    Stage 3B Moderate CKD (GFR = 30-44 mL/min/1 73 square meters)    Stage 4 Severe CKD (GFR = 15-29 mL/min/1 73 square meters)    Stage 5 End Stage CKD (GFR <15 mL/min/1 73 square meters)  Note: GFR calculation is accurate only with a steady state creatinine    CBC and differential [[id number]]  (Abnormal) Collected:  02/16/20 0120    Lab Status:  Final result Specimen:  Blood from Arm, Right Updated:  02/16/20 0135     WBC 6 46 Thousand/uL      RBC 5 53 Million/uL      Hemoglobin 16 2 g/dL      Hematocrit 49 7 %      MCV 90 fL      MCH 29 3 pg      MCHC 32 6 g/dL      RDW 14 5 %      MPV 9 1 fL      Platelets 474 Thousands/uL      nRBC 0 /100 WBCs      Neutrophils Relative 65 %      Immat GRANS % 0 %      Lymphocytes Relative 24 %      Monocytes Relative 7 %      Eosinophils Relative 3 %      Basophils Relative 1 %      Neutrophils Absolute 4 22 Thousands/µL      Immature Grans Absolute 0 01 Thousand/uL      Lymphocytes Absolute 1 52 Thousands/µL      Monocytes Absolute 0 48 Thousand/µL      Eosinophils Absolute 0 20 Thousand/µL      Basophils Absolute 0 03 Thousands/µL                  CT head without contrast   Final Result by Ramesh Lozano MD (02/16 0206)      No acute intracranial abnormality  Workstation performed: [id number]         CT spine cervical without contrast   Final Result by Ramesh Lozano MD (02/16 0216)      No cervical spine fracture or traumatic malalignment                     Workstation performed: [id number]                    Procedures  Procedures         ED Course  ED Course as of Feb 20 0119   Teja Marks Feb 16, 2020 0224 Patient to followup regarding creatinine      0226 Discussed patients creatinine, previous 1 21 with outside records, will have patient followup with pcp  MDM      Disposition  Final diagnoses:   Fall     Time reflects when diagnosis was documented in both MDM as applicable and the Disposition within this note     Time User Action Codes Description Comment    2/16/2020  2:22 AM Jose Renee, [address] [[address]  XXXA] Fall       ED Disposition     ED Disposition Condition Date/Time Comment    Discharge Stable Sun Feb 16, 2020  2:22 AM Gaylan Cap discharge to home/self care              Follow-up Information     Follow up With Specialties Details Why Contact Info    Ata Elder MD Family Medicine In 1 day  [address] [facility]   [phone]            Discharge Medication List as of 2/16/2020  2:25 AM      CONTINUE these medications which have NOT CHANGED    Details   ALBUTEROL IN Inhale, Historical Med      alendronate-cholecalciferol (FOSAMAX PLUS D)  MG-UNIT per tablet Take 1 tablet by mouth once a week, Historical Med      aspirin (ECOTRIN LOW STRENGTH) 81 mg EC tablet Take 81 mg by mouth daily, Historical Med      atorvastatin (LIPITOR) 80 mg tablet Take 80 mg by mouth daily, Historical Med      budesonide-formoterol (SYMBICORT) 160-4 5 mcg/act inhaler Inhale 2 puffs 2 (two) times a day Rinse mouth after use , Historical Med      carboxymethylcellulose 0 5 % SOLN 1 drop 3 (three) times a day as needed for dry eyes, Historical Med      cyanocobalamin 500 MCG tablet Take 500 mcg by mouth daily, Historical Med      dextrose 40 % SOLN Infuse into a venous catheter, Historical Med      Empagliflozin 10 MG TABS Take 10 mg by mouth every morning, Historical Med      ezetimibe (ZETIA) 10 mg tablet Take 10 mg by mouth daily, Historical Med      gabapentin (NEURONTIN) 100 mg capsule Take 100 mg by mouth 3 (three) times a day, Historical Med      glucose 4 g chewable tablet Chew 16 g as needed for low blood sugar, Historical Med      insulin aspart (NovoLOG) 100 units/mL injection Inject under the skin 3 (three) times a day before meals, Historical Med      insulin glargine (LANTUS) 100 units/mL subcutaneous injection Inject under the skin daily at bedtime, Historical Med      loratadine (CLARITIN) 10 mg tablet Take 10 mg by mouth daily, Historical Med      losartan (COZAAR) 100 MG tablet Take 100 mg by mouth daily, Historical Med      magnesium oxide (MAG-OX) 400 mg Take 400 mg by mouth 2 (two) times a day, Historical Med      metFORMIN (GLUCOPHAGE) 1000 MG tablet Take 1,000 mg by mouth 2 (two) times a day with meals, Historical Med      sertraline (ZOLOFT) 100 mg tablet Take 50 mg by mouth daily, Historical Med      tamsulosin (FLOMAX) 0 4 mg Take 0 4 mg by mouth daily with dinner, Historical Med           No discharge procedures on file      PDMP Review     None          ED Provider  Electronically Signed by           Mikel Saavedra MD  02/20/20 7160

## 2023-04-26 ENCOUNTER — TELEPHONE (OUTPATIENT)
Dept: PODIATRY | Facility: CLINIC | Age: 78
End: 2023-04-26

## 2023-05-02 ENCOUNTER — TELEPHONE (OUTPATIENT)
Dept: OBGYN CLINIC | Facility: CLINIC | Age: 78
End: 2023-05-02

## 2023-05-02 NOTE — TELEPHONE ENCOUNTER
Received call from Abdirashid Hernandez at [facility] asking if patient made appointment with Podiatry yet? Referral was issued 4/24/23  I informed Trever Manriquez that our office did receive referral and tried to reach out to patient on 4/26/23  Trever Manriquez said the referral is now void since it is past the 7 days  She will send a formal letter to the patient  I called patient again, 5/2/23, and left another message stating the above  No further action

## 2023-05-15 ENCOUNTER — TELEPHONE (OUTPATIENT)
Dept: PODIATRY | Facility: CLINIC | Age: 78
End: 2023-05-15

## 2023-05-17 ENCOUNTER — TELEPHONE (OUTPATIENT)
Dept: PODIATRY | Facility: CLINIC | Age: 78
End: 2023-05-17

## 2023-06-15 ENCOUNTER — OFFICE VISIT (OUTPATIENT)
Dept: PODIATRY | Facility: CLINIC | Age: 78
End: 2023-06-15

## 2023-06-15 VITALS
BODY MASS INDEX: 26.87 KG/M2 | SYSTOLIC BLOOD PRESSURE: 122 MMHG | HEIGHT: 66 IN | HEART RATE: 92 BPM | DIASTOLIC BLOOD PRESSURE: 69 MMHG | WEIGHT: 167.2 LBS

## 2023-06-15 DIAGNOSIS — B35.1 ONYCHOMYCOSIS: ICD-10-CM

## 2023-06-15 DIAGNOSIS — E11.42 DIABETIC POLYNEUROPATHY ASSOCIATED WITH TYPE 2 DIABETES MELLITUS (HCC): Primary | ICD-10-CM

## 2023-06-15 PROCEDURE — 99203 OFFICE O/P NEW LOW 30 MIN: CPT | Performed by: PODIATRIST

## 2023-06-15 PROCEDURE — 11721 DEBRIDE NAIL 6 OR MORE: CPT | Performed by: PODIATRIST

## 2023-06-15 NOTE — PROGRESS NOTES
"This patient was seen on 6/15/23  My role is Foot , Ankle, and Wound Specialist    SUBJECTIVE    Chief Complaint:  Diabetic foot check     Patient ID: Nato Quesada is a 68 y o  male  Samy Covarrubias is here for the first time for a diabetic foot exam  He's [facility] but has decided to stop seeing the [place] podiatrist  He does wear diabetic custom insoles  He admits to hyperglycemia and most recent A1C was >8%  He admits to walking barefoot at home  He denies any prior history of foot ulcers, infections  He does apply topical ciclopirox to the nails and is very concerned about clearing up a nail fungus infection  The following portions of the patient's history were reviewed and updated as appropriate: allergies, current medications, past family history, past medical history, past social history, past surgical history and problem list     Review of Systems   Constitutional: Negative  Respiratory: Negative  Skin: Positive for color change  Negative for wound  Neurological: Positive for numbness  OBJECTIVE      /69   Pulse 92   Ht 5' 6\" (1 676 m) Comment: verbal  Wt 75 8 kg (167 lb 3 2 oz)   BMI 26 99 kg/m²     Foot/Ankle Musculoskeletal Exam    Inspection    Right      Right foot/ankle inspection is normal      Left      Left foot/ankle inspection is normal       Neurovascular    Right      Dorsalis pedis: 2+      Posterior tibial: 1+    Left      Dorsalis pedis: 2+      Posterior tibial: 1+    General    Neurological: alert       Physical Exam  Vitals and nursing note reviewed  Constitutional:       General: He is not in acute distress  Appearance: Normal appearance  He is not ill-appearing, toxic-appearing or diaphoretic  HENT:      Head: Normocephalic and atraumatic  Cardiovascular:      Pulses: no weak pulses          Dorsalis pedis pulses are 2+ on the right side and 2+ on the left side  Posterior tibial pulses are 1+ on the right side and 1+ on the left side   " Pulmonary:      Effort: Pulmonary effort is normal    Feet:      Right foot:      Skin integrity: No ulcer, skin breakdown, erythema, warmth, callus or dry skin  Left foot:      Skin integrity: No ulcer, skin breakdown, erythema, warmth, callus or dry skin  Skin:     Capillary Refill: Capillary refill takes less than 2 seconds  Comments: Nails yellow-brown, ranging from 1 0cm thick Right hallux nail to 3mm thick, dystrophic, with crumbling subugunal debris x 10  Neurological:      Mental Status: He is alert  Diabetic Foot Exam    Patient's shoes and socks removed  Right Foot/Ankle   Right Foot Inspection  Skin Exam: skin normal and skin intact  No dry skin, no warmth, no callus, no erythema, no maceration, no abnormal color, no pre-ulcer, no ulcer and no callus  Toe Exam: ROM and strength within normal limits  Sensory   Vibration: intact  Proprioception: diminished  Monofilament testing: diminished    Vascular  Capillary refills: < 3 seconds  The right DP pulse is 2+  The right PT pulse is 1+  Left Foot/Ankle  Left Foot Inspection  Skin Exam: skin normal and skin intact  No dry skin, no warmth, no erythema, no maceration, normal color, no pre-ulcer, no ulcer and no callus  Toe Exam: ROM and strength within normal limits  Sensory   Vibration: intact  Proprioception: diminished  Monofilament testing: diminished    Vascular  Capillary refills: < 3 seconds  The left DP pulse is 2+  The left PT pulse is 1+       Assign Risk Category  No deformity present  Loss of protective sensation  No weak pulses  Risk: 1    ASSESSMENT     Diagnoses and all orders for this visit:    Diabetic polyneuropathy associated with type 2 diabetes mellitus ([facility] )    Onychomycosis         Problem List Items Addressed This Visit        Endocrine    Diabetic polyneuropathy associated with type 2 diabetes mellitus ([facility] ) - Primary       Musculoskeletal and Integument    Onychomycosis "          PLAN    He does demonstrate some neuropathy  He would qualify for diabetic nail care  Nails x 10 were debrided with double-action nail forceps of their thickness, length and width  Foot precautions reviewed with patient including the need to wear protective shoegear at all times when walking including in the home, the need to check feet all surfaces daily with a mirror and report and skin breaks to podiatry, the need to apply an emollient to skin of feet daily  I also explained to him that \"curing\" the fungal nail is unlikely due to the duration of the infection and the fact the Right hallux nail demonstrate onychomycosis to the posterior nail fold  Lastly, I explained that topical ciclopirox only has a 6% demonstrated cure rate       "

## 2023-09-14 ENCOUNTER — CONSULT (OUTPATIENT)
Dept: PODIATRY | Facility: CLINIC | Age: 78
End: 2023-09-14

## 2023-09-14 VITALS
DIASTOLIC BLOOD PRESSURE: 62 MMHG | HEIGHT: 66 IN | HEART RATE: 85 BPM | SYSTOLIC BLOOD PRESSURE: 97 MMHG | RESPIRATION RATE: 189 BRPM | WEIGHT: 167 LBS | BODY MASS INDEX: 26.84 KG/M2

## 2023-09-14 DIAGNOSIS — E11.42 DIABETIC POLYNEUROPATHY ASSOCIATED WITH TYPE 2 DIABETES MELLITUS (HCC): Primary | ICD-10-CM

## 2023-09-14 DIAGNOSIS — B35.1 ONYCHOMYCOSIS: ICD-10-CM

## 2023-09-14 PROCEDURE — 99212 OFFICE O/P EST SF 10 MIN: CPT | Performed by: PODIATRIST

## 2023-09-14 NOTE — PROGRESS NOTES
Patient, a 80-year-old type II diabetic male presents for palliative toenail care. On exam, pedal pulses are palpable. The right hallux third and fifth nails are thickened yellowed with subungual debris as is the left fifth toenail. No open areas on feet. Discussed treatment options for onychomycosis. Oral Lamisil needed to treat this disorder but can affect liver and kidneys and patient is disinterested. Treatment consisted of nail debridement. He is rescheduled in 3 months.

## 2023-12-11 ENCOUNTER — TELEPHONE (OUTPATIENT)
Age: 78
End: 2023-12-11

## 2023-12-11 NOTE — TELEPHONE ENCOUNTER
Caller: Rodney Singh    Doctor/Office: Dr. Sara Oreilly    #: [phone]    Escalation: Care Patient would like to know if he has a current [facility] referral for his upcoming  appt? Please return call and advise.  Thank you

## 2023-12-14 ENCOUNTER — OFFICE VISIT (OUTPATIENT)
Dept: PODIATRY | Facility: CLINIC | Age: 78
End: 2023-12-14
Payer: COMMERCIAL

## 2023-12-14 VITALS
HEIGHT: 66 IN | SYSTOLIC BLOOD PRESSURE: 134 MMHG | BODY MASS INDEX: 27.48 KG/M2 | DIASTOLIC BLOOD PRESSURE: 82 MMHG | RESPIRATION RATE: 18 BRPM | WEIGHT: 171 LBS | HEART RATE: 101 BPM

## 2023-12-14 DIAGNOSIS — E11.42 DIABETIC POLYNEUROPATHY ASSOCIATED WITH TYPE 2 DIABETES MELLITUS (HCC): Primary | ICD-10-CM

## 2023-12-14 DIAGNOSIS — B35.1 ONYCHOMYCOSIS: ICD-10-CM

## 2023-12-14 PROCEDURE — 99212 OFFICE O/P EST SF 10 MIN: CPT | Performed by: PODIATRIST

## 2023-12-14 NOTE — PROGRESS NOTES
Patient presents for palliative toenail care. All toenails are thick and yellow with subungual debris on the right foot. Pedal pulses are palpable. Treatment consisted of nail trimming.

## 2024-03-14 ENCOUNTER — OFFICE VISIT (OUTPATIENT)
Dept: PODIATRY | Facility: CLINIC | Age: 79
End: 2024-03-14
Payer: COMMERCIAL

## 2024-03-14 VITALS
HEIGHT: 66 IN | HEART RATE: 90 BPM | SYSTOLIC BLOOD PRESSURE: 112 MMHG | BODY MASS INDEX: 26.84 KG/M2 | WEIGHT: 167 LBS | DIASTOLIC BLOOD PRESSURE: 61 MMHG | RESPIRATION RATE: 18 BRPM

## 2024-03-14 DIAGNOSIS — B35.1 ONYCHOMYCOSIS: ICD-10-CM

## 2024-03-14 DIAGNOSIS — E11.42 DIABETIC POLYNEUROPATHY ASSOCIATED WITH TYPE 2 DIABETES MELLITUS (HCC): Primary | ICD-10-CM

## 2024-03-14 PROCEDURE — 99212 OFFICE O/P EST SF 10 MIN: CPT | Performed by: PODIATRIST

## 2024-03-14 NOTE — PROGRESS NOTES
Patient presents for palliative footcare.  No acute disorder noted.  Pedal pulses are palpable.  Right hallux nail plate is thick and yellow secondary to onychomycosis.  Treatment consisted of nail trimming.  Reappoint 3 months.

## 2024-06-13 ENCOUNTER — OFFICE VISIT (OUTPATIENT)
Dept: PODIATRY | Facility: CLINIC | Age: 79
End: 2024-06-13
Payer: COMMERCIAL

## 2024-06-13 DIAGNOSIS — E11.42 DIABETIC POLYNEUROPATHY ASSOCIATED WITH TYPE 2 DIABETES MELLITUS (HCC): Primary | ICD-10-CM

## 2024-06-13 DIAGNOSIS — B35.1 ONYCHOMYCOSIS: ICD-10-CM

## 2024-06-13 PROCEDURE — 99212 OFFICE O/P EST SF 10 MIN: CPT | Performed by: PODIATRIST

## 2024-06-13 NOTE — PROGRESS NOTES
Patient presents for palliative diabetic footcare.  No acute disorder noted.  Multiple toenails are thickened yellowed with subungual debris consistent with onychomycosis.  Treatment consisted of trimming of multiple ptotic nails and dystrophic nails.  Reappoint months.

## 2024-09-15 ENCOUNTER — APPOINTMENT (EMERGENCY)
Dept: RADIOLOGY | Facility: HOSPITAL | Age: 79
End: 2024-09-15
Payer: COMMERCIAL

## 2024-09-15 ENCOUNTER — APPOINTMENT (EMERGENCY)
Dept: CT IMAGING | Facility: HOSPITAL | Age: 79
End: 2024-09-15
Payer: COMMERCIAL

## 2024-09-15 ENCOUNTER — HOSPITAL ENCOUNTER (EMERGENCY)
Facility: HOSPITAL | Age: 79
Discharge: HOME | End: 2024-09-15
Attending: SURGERY
Payer: COMMERCIAL

## 2024-09-15 VITALS
SYSTOLIC BLOOD PRESSURE: 131 MMHG | DIASTOLIC BLOOD PRESSURE: 62 MMHG | RESPIRATION RATE: 16 BRPM | TEMPERATURE: 97.8 F | HEART RATE: 78 BPM | OXYGEN SATURATION: 94 %
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
## 2024-09-15 LAB
ABO GROUP BLD: NORMAL
BASE EXCESS BLDA CALC-SCNC: 1 MMOL/L (ref -2–3)
BLD GP AB SCN SERPL QL: NEGATIVE
CA-I BLD-SCNC: 1.18 MMOL/L (ref 1.12–1.32)
EST. AVERAGE GLUCOSE BLD GHB EST-MCNC: 194 MG/DL
GLUCOSE SERPL-MCNC: 199 MG/DL (ref 65–140)
GLUCOSE SERPL-MCNC: 200 MG/DL (ref 65–140)
HBA1C MFR BLD: 8.4 %
HCO3 BLDA-SCNC: 27.9 MMOL/L (ref 24–30)
HCT VFR BLD CALC: 46 % (ref 36.5–49.3)
HGB BLDA-MCNC: 15.6 G/DL (ref 12–17)
HOLD SPECIMEN: NORMAL
PCO2 BLD: 29 MMOL/L (ref 21–32)
PCO2 BLD: 51.1 MM HG (ref 42–50)
PH BLD: 7.34 [PH] (ref 7.3–7.4)
PO2 BLD: 50 MM HG (ref 35–45)
POTASSIUM BLD-SCNC: 4.2 MMOL/L (ref 3.5–5.3)
RH BLD: POSITIVE
SAO2 % BLD FROM PO2: 82 % (ref 60–85)
SODIUM BLD-SCNC: 139 MMOL/L (ref 136–145)
SPECIMEN EXPIRATION DATE: NORMAL
SPECIMEN SOURCE: ABNORMAL

## 2024-09-15 PROCEDURE — 36415 COLL VENOUS BLD VENIPUNCTURE: CPT | Performed by: STUDENT IN AN ORGANIZED HEALTH CARE EDUCATION/TRAINING PROGRAM

## 2024-09-15 PROCEDURE — EDAIR PR ED AIR: Performed by: EMERGENCY MEDICINE

## 2024-09-15 PROCEDURE — 82803 BLOOD GASES ANY COMBINATION: CPT

## 2024-09-15 PROCEDURE — 70450 CT HEAD/BRAIN W/O DYE: CPT

## 2024-09-15 PROCEDURE — 71045 X-RAY EXAM CHEST 1 VIEW: CPT

## 2024-09-15 PROCEDURE — 72125 CT NECK SPINE W/O DYE: CPT

## 2024-09-15 PROCEDURE — 93308 TTE F-UP OR LMTD: CPT | Performed by: NURSE PRACTITIONER

## 2024-09-15 PROCEDURE — 76705 ECHO EXAM OF ABDOMEN: CPT | Performed by: NURSE PRACTITIONER

## 2024-09-15 PROCEDURE — 84295 ASSAY OF SERUM SODIUM: CPT

## 2024-09-15 PROCEDURE — 83036 HEMOGLOBIN GLYCOSYLATED A1C: CPT | Performed by: NURSE PRACTITIONER

## 2024-09-15 PROCEDURE — 84132 ASSAY OF SERUM POTASSIUM: CPT

## 2024-09-15 PROCEDURE — 82947 ASSAY GLUCOSE BLOOD QUANT: CPT

## 2024-09-15 PROCEDURE — 82948 REAGENT STRIP/BLOOD GLUCOSE: CPT

## 2024-09-15 PROCEDURE — 86900 BLOOD TYPING SEROLOGIC ABO: CPT | Performed by: SURGERY

## 2024-09-15 PROCEDURE — 82330 ASSAY OF CALCIUM: CPT

## 2024-09-15 PROCEDURE — NC001 PR NO CHARGE: Performed by: NURSE PRACTITIONER

## 2024-09-15 PROCEDURE — 96372 THER/PROPH/DIAG INJ SC/IM: CPT

## 2024-09-15 PROCEDURE — 99284 EMERGENCY DEPT VISIT MOD MDM: CPT

## 2024-09-15 PROCEDURE — 86901 BLOOD TYPING SEROLOGIC RH(D): CPT | Performed by: SURGERY

## 2024-09-15 PROCEDURE — 86850 RBC ANTIBODY SCREEN: CPT | Performed by: SURGERY

## 2024-09-15 PROCEDURE — 85014 HEMATOCRIT: CPT

## 2024-09-15 PROCEDURE — 99204 OFFICE O/P NEW MOD 45 MIN: CPT | Performed by: SURGERY

## 2024-09-15 RX ORDER — LIDOCAINE HYDROCHLORIDE 10 MG/ML
5 INJECTION, SOLUTION EPIDURAL; INFILTRATION; INTRACAUDAL; PERINEURAL ONCE
Status: COMPLETED | OUTPATIENT
Start: 2024-09-15 | End: 2024-09-15

## 2024-09-15 RX ORDER — INSULIN LISPRO 100 [IU]/ML
2-12 INJECTION, SOLUTION INTRAVENOUS; SUBCUTANEOUS
Status: DISCONTINUED | OUTPATIENT
Start: 2024-09-15 | End: 2024-09-15 | Stop reason: HOSPADM

## 2024-09-15 RX ADMIN — LIDOCAINE HYDROCHLORIDE 5 ML: 10 INJECTION, SOLUTION EPIDURAL; INFILTRATION; INTRACAUDAL at 09:42

## 2024-09-15 RX ADMIN — INSULIN LISPRO 2 UNITS: 100 INJECTION, SOLUTION INTRAVENOUS; SUBCUTANEOUS at 10:51

## 2024-09-15 NOTE — DISCHARGE SUMMARY
Discharge Summary - Trauma   Name: Josesito Escobar 79 y.o. male I MRN: [id number]  Unit/Bed#: ED-32 I Date of Admission: 9/15/2024   Date of Service: 9/15/2024 I Hospital Day: 0     Admission Date: 9/15/2024 0825  Discharge Date: 09/15/24  Admitting Diagnosis: No admission diagnoses are documented for this encounter.  Discharge Diagnosis:   Medical Problems       Resolved Problems  Date Reviewed: 9/15/2024   None         HPI: Josesito Escobar is a 79 y.o. male who presents after a fall at home and struck the back of his head on a door knob.  There is a laceration and currently no bleeding however patient has blood matted in his hair and dried blood on his chest. He is also diabetic, no insulin as of yet.  Takes plavix for stents placed a year ago.     Procedures Performed:   Orders Placed This Encounter   Procedures    Fast Ultrasound       Summary of Hospital Course: 79 year old male s/p fall and head strike with posterior head lac.  Bleeding controlled and sutures placed.  Per attending, glue in between sutures.  Hematoma almost resolved til done with procedure.  Patient ambulated, no vertigo or headache.  Was discharged home and [facility]     Significant Findings, Care, Treatment and Services Provided: XR Trauma multiple ([facility] trauma bay ONLY)    Result Date: 9/15/2024  Impression: No acute consolidation or congestion Hypoinflated lungs Computerized Assisted Algorithm (CAA) may have been used to analyze all applicable images. Workstation performed: [id number]     TRAUMA - CT head wo contrast    Result Date: 9/15/2024  Impression: No acute intracranial hemorrhage seen No mass effect or midline shift seen I personally discussed this study with LUBA DAVIDSON on 9/15/2024 9:05 AM. Workstation performed: [id number]     TRAUMA - CT spine cervical wo contrast    Result Date: 9/15/2024  Impression: No acute compression collapse of the vertebra Workstation performed:  [id number]         Complications: none    Condition at Discharge: stable       Discharge instructions/Information to patient and family:   See After Visit Summary (AVS) for information provided to patient and family.      Provisions for Follow-Up Care:  See after visit summary for information related to follow-up care and any pertinent home health orders.      PCP: Ulises Denny MD    Disposition: Home    Planned Readmission: No     Discharge Medications:  See after visit summary for reconciled discharge medications provided to patient and family.      Discharge Statement:  I have spent a total time of 30 minutes in caring for this patient on the day of the visit/encounter. .

## 2024-09-15 NOTE — ED PROVIDER NOTES
Emergency Department Airway Evaluation and Management Form    History  Obtained from: patient  Rosuvastatin  No chief complaint on file.    HPI    Past Medical History:   Diagnosis Date    COPD (chronic obstructive pulmonary disease) (HCC)     Coronary artery disease     Diabetes mellitus (HCC)     Hypertension      Past Surgical History:   Procedure Laterality Date    CORONARY ARTERY BYPASS GRAFT      HERNIA REPAIR      KNEE SURGERY Left      No family history on file.  Social History     Tobacco Use    Smoking status: Former    Smokeless tobacco: Never   Vaping Use    Vaping status: Never Used   Substance Use Topics    Alcohol use: Never    Drug use: Never     I have reviewed and agree with the history as documented.    Review of Systems    Physical Exam  /78   Pulse 77   Temp 97.8 °F (36.6 °C) (Oral)   Resp 20   SpO2 92% Comment: 2L NC    Physical Exam    ED Medications  Medications - No data to display    Intubation  Procedures    Notes  I was present in trauma bay for airway evaluation. Airway is patent and protected. No acute airway intervention required at this time. Further management of this patient by trauma attending and staff. I am available for airway management should condition change.      Final Diagnosis  Final diagnoses:   None       ED Provider  Electronically Signed by     Ansatacio Martinez DO  09/15/24 0834

## 2024-09-15 NOTE — PROCEDURES
POC FAST US    Date/Time: 9/15/2024 9:04 AM    Performed by: DARINEL Ramirez  Authorized by: DARINEL Ramirez    Patient location:  Trauma  Procedure details:     Exam Type:  Diagnostic    Indications: blunt abdominal trauma and blunt chest trauma      Assess for:  Intra-abdominal fluid and pericardial effusion    Technique: FAST      Views obtained:  Heart - Pericardial sac, LUQ - Splenorenal space, Suprapubic - Pouch of John and RUQ - Flynn's Pouch    Image quality: diagnostic      Image availability:  Images available in PACS and video obtained  FAST Findings:     RUQ (Hepatorenal) free fluid: absent      LUQ (Splenorenal) free fluid: absent      Suprapubic free fluid: absent      Cardiac wall motion: identified      Pericardial effusion: absent    Interpretation:     Impressions: negative

## 2024-09-15 NOTE — H&P
H&P - Trauma   Name: Josesito Escobar 79 y.o. male I MRN: [id number]  Unit/Bed#: ED-32 I Date of Admission: 9/15/2024   Date of Service: 9/15/2024 I Hospital Day: 0     Assessment & Plan        Trauma Alert: Level B   Model of Arrival: Ambulance    Trauma Team: Glory Reed and MELINDA Neely  Consultants:     None     History of Present Illness   Chief Complaint: some pain in back of head where there is a laceration  Mechanism:Fall     Josesito Escobar is a 79 y.o. male who presents after a fall at home and struck the back of his head on a door knob.  There is a laceration and currently no bleeding however patient has blood matted in his hair and dried blood on his chest. He is also diabetic, no insulin as of yet.  Takes plavix for stents placed a year ago.    Review of Systems   Constitutional: Negative.    HENT: Negative.     Eyes: Negative.    Respiratory: Negative.     Cardiovascular: Negative.    Gastrointestinal: Negative.    Endocrine: Negative.    Genitourinary: Negative.    Musculoskeletal: Negative.    Skin:  Positive for wound.        Laceration back of head   Allergic/Immunologic: Negative.    Neurological: Negative.    Hematological: Negative.    Psychiatric/Behavioral: Negative.         Immunization History   Administered Date(s) Administered    COVID-19 PFIZER VACCINE 0.3 ML IM 01/19/2022     Last Tetanus: today    1. Before the illness or injury that brought you to the Emergency, did you need someone to help you on a regular basis? 0=No   2. Since the illness or injury that brought you to the Emergency, have you needed more help than usual to take care of yourself? 0=No   3. Have you been hospitalized for one or more nights during the past 6 months (excluding a stay in the Emergency Department)? 0=No   4. In general, do you see well? 0=Yes   5. In general, do you have serious problems with your memory? 0=No   6. Do you take more than three different medications everyday? 1=Yes   TOTAL   1     Did you  order a geriatric consult if the score was 2 or greater?: no       Objective   Initial Vitals:   Temperature: 97.8 °F (36.6 °C) (09/15/24 0833)  Pulse: 77 (09/15/24 0830)  Respirations: 20 (09/15/24 0830)  Blood Pressure: 168/78 (09/15/24 0830)    Primary Survey:   Airway:        Status: patent;        Pre-hospital Interventions: none        Hospital Interventions: none  Breathing:        Pre-hospital Interventions: none       Effort: normal       Right breath sounds: normal       Left breath sounds: normal  Circulation:        Rhythm: regular       Rate: regular   Right Pulses Left Pulses    R radial: 2+  R femoral: 2+  R pedal: 2+     L radial: 2+  L femoral: 2+  L pedal: 2+       Disability:        GCS: Eye: 4; Verbal: 5 Motor: 6 Total: 15       Right Pupil: round;  reactive         Left Pupil:  round;  reactive      R Motor Strength L Motor Strength    R : 5/5  R dorsiflex: 5/5  R plantarflex: 5/5 L : 5/5  L dorsiflex: 5/5  L plantarflex: 5/5        Sensory:  No sensory deficit  Exposure:           Secondary Survey:  Physical Exam  Constitutional:       Appearance: Normal appearance.   HENT:      Head: Normocephalic.      Right Ear: External ear normal.      Left Ear: External ear normal.      Nose: Nose normal.      Mouth/Throat:      Pharynx: Oropharynx is clear.   Eyes:      Extraocular Movements: Extraocular movements intact.      Conjunctiva/sclera: Conjunctivae normal.      Pupils: Pupils are equal, round, and reactive to light.   Cardiovascular:      Rate and Rhythm: Normal rate and regular rhythm.   Abdominal:      Palpations: Abdomen is soft.      Tenderness: There is no abdominal tenderness.   Genitourinary:     Comments: Perineum clear  Musculoskeletal:         General: No tenderness. Normal range of motion.      Cervical back: Normal range of motion.   Skin:     General: Skin is warm.      Capillary Refill: Capillary refill takes less than 2 seconds.   Neurological:      General: No focal  deficit present.      Mental Status: He is alert and oriented to person, place, and time.   Psychiatric:         Mood and Affect: Mood normal.         Behavior: Behavior normal.         Thought Content: Thought content normal.         Judgment: Judgment normal.         Invasive Devices       Peripheral Intravenous Line  Duration             Peripheral IV 09/15/24 Left Antecubital <1 day                    Lab Results: I have reviewed the following results:   Recent Labs     09/15/24  0836   HGB 15.6   HCT 46   CO2 29   CAIONIZED 1.18       Imaging Results: I have personally reviewed pertinent images saved in PACS. CT scan findings (and other pertinent positive findings on images) were discussed with radiology. My interpretation of the images/reports are as follows:  Chest Xray(s):    FAST exam(s): neg   CT Scan(s): HCT   Additional Xray(s): CT C-spine     Other Studies:

## 2024-09-16 NOTE — RESULT ENCOUNTER NOTE
Patient called at  [phone].  States that his last hemoglobin A1c was drawn approximately 2 months ago and was 8.5.  Did have some medication adjustments at that time.  States that he has difficulty with adhering to the good diet.

## 2024-10-10 ENCOUNTER — TELEPHONE (OUTPATIENT)
Age: 79
End: 2024-10-10

## 2024-12-10 ENCOUNTER — OFFICE VISIT (OUTPATIENT)
Dept: PODIATRY | Facility: CLINIC | Age: 79
End: 2024-12-10
Payer: COMMERCIAL

## 2024-12-10 VITALS
SYSTOLIC BLOOD PRESSURE: 116 MMHG | HEART RATE: 80 BPM | BODY MASS INDEX: 26.84 KG/M2 | DIASTOLIC BLOOD PRESSURE: 68 MMHG | WEIGHT: 167 LBS | HEIGHT: 66 IN

## 2024-12-10 DIAGNOSIS — E11.9 CONTROLLED TYPE 2 DIABETES MELLITUS WITHOUT COMPLICATION, WITH LONG-TERM CURRENT USE OF INSULIN (HCC): Primary | ICD-10-CM

## 2024-12-10 DIAGNOSIS — Z79.4 CONTROLLED TYPE 2 DIABETES MELLITUS WITHOUT COMPLICATION, WITH LONG-TERM CURRENT USE OF INSULIN (HCC): Primary | ICD-10-CM

## 2024-12-10 PROCEDURE — 99213 OFFICE O/P EST LOW 20 MIN: CPT | Performed by: PODIATRIST

## 2024-12-10 RX ORDER — SCOLOPAMINE TRANSDERMAL SYSTEM 1 MG/1
PATCH, EXTENDED RELEASE TRANSDERMAL
COMMUNITY

## 2024-12-10 RX ORDER — ISOSORBIDE DINITRATE 20 MG/1
20 TABLET ORAL
COMMUNITY
Start: 2024-09-05

## 2024-12-10 RX ORDER — CLOPIDOGREL BISULFATE 75 MG/1
75 TABLET ORAL
COMMUNITY
Start: 2024-06-05

## 2024-12-10 RX ORDER — ACETAMINOPHEN 325 MG/1
650 TABLET ORAL
COMMUNITY
Start: 2024-09-24

## 2024-12-10 RX ORDER — LIDOCAINE 50 MG/G
PATCH TOPICAL
COMMUNITY
Start: 2024-09-24

## 2024-12-10 RX ORDER — VITAMIN B COMPLEX
CAPSULE ORAL
COMMUNITY
Start: 2024-11-14

## 2024-12-10 NOTE — ASSESSMENT & PLAN NOTE
Lab Results   Component Value Date    HGBA1C 8.4 (H) 09/15/2024       Orders:    Diabetic Shoe Inserts    Diabetic Shoe

## 2024-12-10 NOTE — PROGRESS NOTES
"Name: Josesito Escobar      : 1945      MRN: [id number]  Encounter Provider: Alan Butts DPM  Encounter Date: 12/10/2024   Encounter department: [facility] [place]    Discussed principles of diabetic footcare.  Vascular status is within normal limits and sensorium is intact.  Urged patient to refrain from walking barefoot.    The right great toenail is thick secondary to onychomycosis.  Remainder of toenails are elongated.  Treatment consisted of nail trimming.    Prescribed diabetic shoes and insoles at patient request.  [facility].  :  Assessment & Plan  Controlled type 2 diabetes mellitus without complication, with long-term current use of insulin ([facility])    Lab Results   Component Value Date    HGBA1C 8.4 (H) 09/15/2024       Orders:    Diabetic Shoe Inserts    Diabetic Shoe        History of Present Illness   HPI  Josesito Escobar is a 79 y.o. male who presents for yearly diabetic foot exam.  Patient states that he is doing well with no pedal complications from diabetes.  He denies numbness or tingling in his feet.  He desires a prescription for diabetic shoes and insoles.  I personally reviewed an A1c dated 9/15/2024.  It was 8.4.    I personally reviewed an A1c dated 3/14/2022.  It was 7.5.        Review of Systems   Gastrointestinal: Negative.    Musculoskeletal: Negative.    Psychiatric/Behavioral: Negative.                Objective   /68 (BP Location: Right arm, Patient Position: Sitting, Cuff Size: Large)   Pulse 80   Ht 5' 6\" (1.676 m) Comment: verbal  Wt 75.8 kg (167 lb)   BMI 26.95 kg/m²      Physical Exam  Cardiovascular:      Pulses: no weak pulses.           Dorsalis pedis pulses are 2+ on the right side and 2+ on the left side.        Posterior tibial pulses are 2+ on the right side and 2+ on the left side.   Feet:      Right foot:      Skin integrity: No ulcer, skin breakdown, erythema, warmth, callus or dry " skin.      Left foot:      Skin integrity: No ulcer, skin breakdown, erythema, warmth, callus or dry skin.         Diabetic Foot Exam    Patient's shoes and socks removed.    Right Foot/Ankle   Right Foot Inspection  Skin Exam: skin normal and skin intact. No dry skin, no warmth, no callus, no erythema, no maceration, no abnormal color, no pre-ulcer, no ulcer and no callus.     Toe Exam: ROM and strength within normal limits.     Sensory   Vibration: intact  Proprioception: intact  Monofilament testing: intact    Vascular  Capillary refills: < 3 seconds  The right DP pulse is 2+. The right PT pulse is 2+.     Right Toe  - Comprehensive Exam  Ecchymosis: none  Arch: normal  Hammertoes: absent  Claw Toes: absent  Swelling: none   Tenderness: none       Left Foot/Ankle  Left Foot Inspection  Skin Exam: skin normal and skin intact. No dry skin, no warmth, no erythema, no maceration, normal color, no pre-ulcer, no ulcer and no callus.     Toe Exam: ROM and strength within normal limits.     Sensory   Vibration: intact  Proprioception: intact  Monofilament testing: intact    Vascular  Capillary refills: < 3 seconds  The left DP pulse is 2+. The left PT pulse is 2+.     Left Toe  - Comprehensive Exam  Ecchymosis: none  Arch: normal  Hammertoes: absent  Claw toes: absent  Swelling: none   Tenderness: none       Assign Risk Category  No deformity present  No loss of protective sensation  No weak pulses  Risk: 0

## 2025-06-26 ENCOUNTER — TELEPHONE (OUTPATIENT)
Age: 80
End: 2025-06-26

## 2025-06-26 NOTE — TELEPHONE ENCOUNTER
Patient calling in to see if we got the [facility] referral they sent. Patient will call back in a few days to check again. Thank you

## 2025-08-07 ENCOUNTER — PROCEDURE VISIT (OUTPATIENT)
Dept: PODIATRY | Facility: CLINIC | Age: 80
End: 2025-08-07

## 2025-08-07 VITALS — WEIGHT: 164 LBS | RESPIRATION RATE: 18 BRPM | BODY MASS INDEX: 26.36 KG/M2 | HEIGHT: 66 IN

## 2025-08-07 DIAGNOSIS — Z79.4 CONTROLLED TYPE 2 DIABETES MELLITUS WITHOUT COMPLICATION, WITH LONG-TERM CURRENT USE OF INSULIN (HCC): ICD-10-CM

## 2025-08-07 DIAGNOSIS — B35.1 ONYCHOMYCOSIS: Primary | ICD-10-CM

## 2025-08-07 DIAGNOSIS — E11.9 CONTROLLED TYPE 2 DIABETES MELLITUS WITHOUT COMPLICATION, WITH LONG-TERM CURRENT USE OF INSULIN (HCC): ICD-10-CM

## 2025-08-07 PROCEDURE — NCFTCARE PR NON-COVERED FOOT CARE: Performed by: PODIATRIST
